# Patient Record
Sex: FEMALE | Race: BLACK OR AFRICAN AMERICAN | NOT HISPANIC OR LATINO | URBAN - METROPOLITAN AREA
[De-identification: names, ages, dates, MRNs, and addresses within clinical notes are randomized per-mention and may not be internally consistent; named-entity substitution may affect disease eponyms.]

---

## 2017-11-23 ENCOUNTER — HOSPITAL ENCOUNTER (INPATIENT)
Facility: HOSPITAL | Age: 62
LOS: 6 days | End: 2017-11-29
Attending: EMERGENCY MEDICINE | Admitting: INTERNAL MEDICINE

## 2017-11-23 ENCOUNTER — APPOINTMENT (OUTPATIENT)
Dept: GENERAL RADIOLOGY | Facility: HOSPITAL | Age: 62
End: 2017-11-23

## 2017-11-23 ENCOUNTER — APPOINTMENT (OUTPATIENT)
Dept: CT IMAGING | Facility: HOSPITAL | Age: 62
End: 2017-11-23

## 2017-11-23 DIAGNOSIS — R41.82 ALTERED MENTAL STATUS, UNSPECIFIED ALTERED MENTAL STATUS TYPE: ICD-10-CM

## 2017-11-23 DIAGNOSIS — T68.XXXA HYPOTHERMIA, INITIAL ENCOUNTER: Primary | ICD-10-CM

## 2017-11-23 DIAGNOSIS — R00.1 SINUS BRADYCARDIA: ICD-10-CM

## 2017-11-23 LAB
ALBUMIN SERPL-MCNC: 3.7 G/DL (ref 3.5–5.2)
ALBUMIN/GLOB SERPL: 1 G/DL
ALP SERPL-CCNC: 108 U/L (ref 39–117)
ALT SERPL W P-5'-P-CCNC: 78 U/L (ref 1–33)
AMPHET+METHAMPHET UR QL: NEGATIVE
ANION GAP SERPL CALCULATED.3IONS-SCNC: 13.2 MMOL/L
ANION GAP SERPL CALCULATED.3IONS-SCNC: 13.6 MMOL/L
AST SERPL-CCNC: 153 U/L (ref 1–32)
BACTERIA UR QL AUTO: ABNORMAL /HPF
BARBITURATES UR QL SCN: NEGATIVE
BASOPHILS # BLD AUTO: 0.01 10*3/MM3 (ref 0–0.2)
BASOPHILS NFR BLD AUTO: 0.1 % (ref 0–1.5)
BENZODIAZ UR QL SCN: NEGATIVE
BILIRUB SERPL-MCNC: 0.2 MG/DL (ref 0.1–1.2)
BILIRUB UR QL STRIP: NEGATIVE
BUN BLD-MCNC: 16 MG/DL (ref 8–23)
BUN BLD-MCNC: 18 MG/DL (ref 8–23)
BUN/CREAT SERPL: 26.9 (ref 7–25)
BUN/CREAT SERPL: 31.4 (ref 7–25)
CALCIUM SPEC-SCNC: 9.4 MG/DL (ref 8.6–10.5)
CALCIUM SPEC-SCNC: 9.5 MG/DL (ref 8.6–10.5)
CANNABINOIDS SERPL QL: NEGATIVE
CHLORIDE SERPL-SCNC: 105 MMOL/L (ref 98–107)
CHLORIDE SERPL-SCNC: 107 MMOL/L (ref 98–107)
CK SERPL-CCNC: 1817 U/L (ref 20–180)
CLARITY UR: CLEAR
CO2 SERPL-SCNC: 25.8 MMOL/L (ref 22–29)
CO2 SERPL-SCNC: 26.4 MMOL/L (ref 22–29)
COCAINE UR QL: NEGATIVE
COLOR UR: YELLOW
CREAT BLD-MCNC: 0.51 MG/DL (ref 0.57–1)
CREAT BLD-MCNC: 0.67 MG/DL (ref 0.57–1)
D-LACTATE SERPL-SCNC: 3.2 MMOL/L (ref 0.5–2)
DEPRECATED RDW RBC AUTO: 47.5 FL (ref 37–54)
EOSINOPHIL # BLD AUTO: 0 10*3/MM3 (ref 0–0.7)
EOSINOPHIL NFR BLD AUTO: 0 % (ref 0.3–6.2)
ERYTHROCYTE [DISTWIDTH] IN BLOOD BY AUTOMATED COUNT: 14.5 % (ref 11.7–13)
ETHANOL BLD-MCNC: <10 MG/DL (ref 0–10)
ETHANOL UR QL: <0.01 %
GFR SERPL CREATININE-BSD FRML MDRD: 108 ML/MIN/1.73
GFR SERPL CREATININE-BSD FRML MDRD: 148 ML/MIN/1.73
GLOBULIN UR ELPH-MCNC: 3.6 GM/DL
GLUCOSE BLD-MCNC: 101 MG/DL (ref 65–99)
GLUCOSE BLD-MCNC: 48 MG/DL (ref 65–99)
GLUCOSE BLDC GLUCOMTR-MCNC: 101 MG/DL (ref 70–130)
GLUCOSE BLDC GLUCOMTR-MCNC: 81 MG/DL (ref 70–130)
GLUCOSE BLDC GLUCOMTR-MCNC: 85 MG/DL (ref 70–130)
GLUCOSE UR STRIP-MCNC: NEGATIVE MG/DL
HCT VFR BLD AUTO: 35.7 % (ref 35.6–45.5)
HGB BLD-MCNC: 12.4 G/DL (ref 11.9–15.5)
HGB UR QL STRIP.AUTO: NEGATIVE
HOLD SPECIMEN: NORMAL
HYALINE CASTS UR QL AUTO: ABNORMAL /LPF
IMM GRANULOCYTES # BLD: 0.03 10*3/MM3 (ref 0–0.03)
IMM GRANULOCYTES NFR BLD: 0.3 % (ref 0–0.5)
KETONES UR QL STRIP: NEGATIVE
LEUKOCYTE ESTERASE UR QL STRIP.AUTO: ABNORMAL
LYMPHOCYTES # BLD AUTO: 0.77 10*3/MM3 (ref 0.9–4.8)
LYMPHOCYTES NFR BLD AUTO: 8.2 % (ref 19.6–45.3)
MCH RBC QN AUTO: 31 PG (ref 26.9–32)
MCHC RBC AUTO-ENTMCNC: 34.7 G/DL (ref 32.4–36.3)
MCV RBC AUTO: 89.3 FL (ref 80.5–98.2)
METHADONE UR QL SCN: NEGATIVE
MONOCYTES # BLD AUTO: 0.64 10*3/MM3 (ref 0.2–1.2)
MONOCYTES NFR BLD AUTO: 6.8 % (ref 5–12)
NEUTROPHILS # BLD AUTO: 7.93 10*3/MM3 (ref 1.9–8.1)
NEUTROPHILS NFR BLD AUTO: 84.6 % (ref 42.7–76)
NITRITE UR QL STRIP: NEGATIVE
OPIATES UR QL: NEGATIVE
OXYCODONE UR QL SCN: NEGATIVE
PH UR STRIP.AUTO: 6 [PH] (ref 5–8)
PLATELET # BLD AUTO: 379 10*3/MM3 (ref 140–500)
PMV BLD AUTO: 9.7 FL (ref 6–12)
POTASSIUM BLD-SCNC: 3.8 MMOL/L (ref 3.5–5.2)
POTASSIUM BLD-SCNC: 4.6 MMOL/L (ref 3.5–5.2)
PROCALCITONIN SERPL-MCNC: 0.05 NG/ML (ref 0.1–0.25)
PROT SERPL-MCNC: 7.3 G/DL (ref 6–8.5)
PROT UR QL STRIP: NEGATIVE
RBC # BLD AUTO: 4 10*6/MM3 (ref 3.9–5.2)
RBC # UR: ABNORMAL /HPF
REF LAB TEST METHOD: ABNORMAL
SODIUM BLD-SCNC: 144 MMOL/L (ref 136–145)
SODIUM BLD-SCNC: 147 MMOL/L (ref 136–145)
SP GR UR STRIP: 1.01 (ref 1–1.03)
SQUAMOUS #/AREA URNS HPF: ABNORMAL /HPF
TSH SERPL DL<=0.05 MIU/L-ACNC: 0.88 MIU/ML (ref 0.27–4.2)
UROBILINOGEN UR QL STRIP: ABNORMAL
WBC NRBC COR # BLD: 9.38 10*3/MM3 (ref 4.5–10.7)
WBC UR QL AUTO: ABNORMAL /HPF

## 2017-11-23 PROCEDURE — 80307 DRUG TEST PRSMV CHEM ANLYZR: CPT | Performed by: EMERGENCY MEDICINE

## 2017-11-23 PROCEDURE — 80048 BASIC METABOLIC PNL TOTAL CA: CPT | Performed by: INTERNAL MEDICINE

## 2017-11-23 PROCEDURE — 84145 PROCALCITONIN (PCT): CPT | Performed by: INTERNAL MEDICINE

## 2017-11-23 PROCEDURE — 70450 CT HEAD/BRAIN W/O DYE: CPT

## 2017-11-23 PROCEDURE — 93005 ELECTROCARDIOGRAM TRACING: CPT | Performed by: EMERGENCY MEDICINE

## 2017-11-23 PROCEDURE — 99291 CRITICAL CARE FIRST HOUR: CPT

## 2017-11-23 PROCEDURE — 25010000002 ENOXAPARIN PER 10 MG: Performed by: INTERNAL MEDICINE

## 2017-11-23 PROCEDURE — 94799 UNLISTED PULMONARY SVC/PX: CPT

## 2017-11-23 PROCEDURE — 83605 ASSAY OF LACTIC ACID: CPT | Performed by: INTERNAL MEDICINE

## 2017-11-23 PROCEDURE — 87086 URINE CULTURE/COLONY COUNT: CPT | Performed by: EMERGENCY MEDICINE

## 2017-11-23 PROCEDURE — 82550 ASSAY OF CK (CPK): CPT | Performed by: INTERNAL MEDICINE

## 2017-11-23 PROCEDURE — 84443 ASSAY THYROID STIM HORMONE: CPT | Performed by: EMERGENCY MEDICINE

## 2017-11-23 PROCEDURE — 80053 COMPREHEN METABOLIC PANEL: CPT | Performed by: EMERGENCY MEDICINE

## 2017-11-23 PROCEDURE — 93010 ELECTROCARDIOGRAM REPORT: CPT | Performed by: INTERNAL MEDICINE

## 2017-11-23 PROCEDURE — 81001 URINALYSIS AUTO W/SCOPE: CPT | Performed by: EMERGENCY MEDICINE

## 2017-11-23 PROCEDURE — 25010000002 LORAZEPAM PER 2 MG: Performed by: PSYCHIATRY & NEUROLOGY

## 2017-11-23 PROCEDURE — 99223 1ST HOSP IP/OBS HIGH 75: CPT | Performed by: PSYCHIATRY & NEUROLOGY

## 2017-11-23 PROCEDURE — 93005 ELECTROCARDIOGRAM TRACING: CPT | Performed by: PSYCHIATRY & NEUROLOGY

## 2017-11-23 PROCEDURE — 82962 GLUCOSE BLOOD TEST: CPT

## 2017-11-23 PROCEDURE — 93005 ELECTROCARDIOGRAM TRACING: CPT | Performed by: INTERNAL MEDICINE

## 2017-11-23 PROCEDURE — 85025 COMPLETE CBC W/AUTO DIFF WBC: CPT | Performed by: EMERGENCY MEDICINE

## 2017-11-23 PROCEDURE — 71010 HC CHEST PA OR AP: CPT

## 2017-11-23 PROCEDURE — 25010000002 LEVETIRACETAM IN NACL 0.82% 500 MG/100ML SOLUTION: Performed by: INTERNAL MEDICINE

## 2017-11-23 PROCEDURE — 25010000002 LEVOFLOXACIN PER 250 MG: Performed by: INTERNAL MEDICINE

## 2017-11-23 PROCEDURE — 92610 EVALUATE SWALLOWING FUNCTION: CPT

## 2017-11-23 RX ORDER — LEVETIRACETAM 5 MG/ML
500 INJECTION INTRAVASCULAR EVERY 12 HOURS SCHEDULED
Status: DISCONTINUED | OUTPATIENT
Start: 2017-11-23 | End: 2017-11-23

## 2017-11-23 RX ORDER — ONDANSETRON 2 MG/ML
4 INJECTION INTRAMUSCULAR; INTRAVENOUS EVERY 6 HOURS PRN
Status: DISCONTINUED | OUTPATIENT
Start: 2017-11-23 | End: 2017-11-29 | Stop reason: HOSPADM

## 2017-11-23 RX ORDER — LORAZEPAM 2 MG/ML
1 INJECTION INTRAMUSCULAR EVERY 4 HOURS PRN
Status: DISCONTINUED | OUTPATIENT
Start: 2017-11-23 | End: 2017-11-29 | Stop reason: HOSPADM

## 2017-11-23 RX ORDER — ACETAMINOPHEN 325 MG/1
650 TABLET ORAL EVERY 4 HOURS PRN
Status: DISCONTINUED | OUTPATIENT
Start: 2017-11-23 | End: 2017-11-29 | Stop reason: HOSPADM

## 2017-11-23 RX ORDER — LEVOFLOXACIN 5 MG/ML
500 INJECTION, SOLUTION INTRAVENOUS EVERY 24 HOURS
Status: DISCONTINUED | OUTPATIENT
Start: 2017-11-23 | End: 2017-11-23

## 2017-11-23 RX ORDER — ACETAMINOPHEN 650 MG/1
650 SUPPOSITORY RECTAL EVERY 4 HOURS PRN
Status: DISCONTINUED | OUTPATIENT
Start: 2017-11-23 | End: 2017-11-29 | Stop reason: HOSPADM

## 2017-11-23 RX ORDER — DEXTROSE MONOHYDRATE 25 G/50ML
INJECTION, SOLUTION INTRAVENOUS
Status: DISPENSED
Start: 2017-11-23 | End: 2017-11-24

## 2017-11-23 RX ORDER — SODIUM CHLORIDE 9 MG/ML
125 INJECTION, SOLUTION INTRAVENOUS CONTINUOUS
Status: DISCONTINUED | OUTPATIENT
Start: 2017-11-23 | End: 2017-11-24

## 2017-11-23 RX ORDER — LEVETIRACETAM 5 MG/ML
500 INJECTION INTRAVASCULAR EVERY 6 HOURS
Status: DISCONTINUED | OUTPATIENT
Start: 2017-11-24 | End: 2017-11-24

## 2017-11-23 RX ORDER — DEXTROSE MONOHYDRATE, SODIUM CHLORIDE, SODIUM LACTATE, POTASSIUM CHLORIDE, CALCIUM CHLORIDE 5; 600; 310; 179; 20 G/100ML; MG/100ML; MG/100ML; MG/100ML; MG/100ML
200 INJECTION, SOLUTION INTRAVENOUS CONTINUOUS
Status: DISCONTINUED | OUTPATIENT
Start: 2017-11-23 | End: 2017-11-24

## 2017-11-23 RX ORDER — ZIPRASIDONE MESYLATE 20 MG/ML
20 INJECTION, POWDER, LYOPHILIZED, FOR SOLUTION INTRAMUSCULAR EVERY 4 HOURS PRN
Status: DISCONTINUED | OUTPATIENT
Start: 2017-11-23 | End: 2017-11-29 | Stop reason: HOSPADM

## 2017-11-23 RX ORDER — SODIUM CHLORIDE 0.9 % (FLUSH) 0.9 %
1-10 SYRINGE (ML) INJECTION AS NEEDED
Status: DISCONTINUED | OUTPATIENT
Start: 2017-11-23 | End: 2017-11-29 | Stop reason: HOSPADM

## 2017-11-23 RX ADMIN — DEXTROSE MONOHYDRATE, SODIUM CHLORIDE, SODIUM LACTATE, POTASSIUM CHLORIDE, CALCIUM CHLORIDE 200 ML/HR: 5; 600; 310; 179; 20 INJECTION, SOLUTION INTRAVENOUS at 16:06

## 2017-11-23 RX ADMIN — SODIUM CHLORIDE 125 ML/HR: 9 INJECTION, SOLUTION INTRAVENOUS at 12:37

## 2017-11-23 RX ADMIN — ENOXAPARIN SODIUM 40 MG: 40 INJECTION SUBCUTANEOUS at 18:13

## 2017-11-23 RX ADMIN — DEXTROSE MONOHYDRATE, SODIUM CHLORIDE, SODIUM LACTATE, POTASSIUM CHLORIDE, CALCIUM CHLORIDE 200 ML/HR: 5; 600; 310; 179; 20 INJECTION, SOLUTION INTRAVENOUS at 21:58

## 2017-11-23 RX ADMIN — LEVETIRACETAM 500 MG: 500 INJECTION, SOLUTION INTRAVENOUS at 20:00

## 2017-11-23 RX ADMIN — AZTREONAM 1 G: 1 INJECTION, POWDER, LYOPHILIZED, FOR SOLUTION INTRAMUSCULAR; INTRAVENOUS at 18:13

## 2017-11-23 RX ADMIN — LORAZEPAM 1 MG: 2 INJECTION INTRAMUSCULAR; INTRAVENOUS at 21:58

## 2017-11-24 ENCOUNTER — APPOINTMENT (OUTPATIENT)
Dept: NEUROLOGY | Facility: HOSPITAL | Age: 62
End: 2017-11-24
Attending: PSYCHIATRY & NEUROLOGY

## 2017-11-24 ENCOUNTER — APPOINTMENT (OUTPATIENT)
Dept: GENERAL RADIOLOGY | Facility: HOSPITAL | Age: 62
End: 2017-11-24
Attending: INTERNAL MEDICINE

## 2017-11-24 LAB
ANION GAP SERPL CALCULATED.3IONS-SCNC: 7.8 MMOL/L
ANION GAP SERPL CALCULATED.3IONS-SCNC: 8.5 MMOL/L
ARTERIAL PATENCY WRIST A: ABNORMAL
ATMOSPHERIC PRESS: 744.8 MMHG
BASE EXCESS BLDA CALC-SCNC: 2.9 MMOL/L (ref 0–2)
BDY SITE: ABNORMAL
BUN BLD-MCNC: 11 MG/DL (ref 8–23)
BUN BLD-MCNC: 13 MG/DL (ref 8–23)
BUN/CREAT SERPL: 15.9 (ref 7–25)
BUN/CREAT SERPL: 17.8 (ref 7–25)
CALCIUM SPEC-SCNC: 8.7 MG/DL (ref 8.6–10.5)
CALCIUM SPEC-SCNC: 8.7 MG/DL (ref 8.6–10.5)
CHLORIDE SERPL-SCNC: 110 MMOL/L (ref 98–107)
CHLORIDE SERPL-SCNC: 113 MMOL/L (ref 98–107)
CO2 SERPL-SCNC: 27.2 MMOL/L (ref 22–29)
CO2 SERPL-SCNC: 27.5 MMOL/L (ref 22–29)
CREAT BLD-MCNC: 0.69 MG/DL (ref 0.57–1)
CREAT BLD-MCNC: 0.73 MG/DL (ref 0.57–1)
D-LACTATE SERPL-SCNC: 1.3 MMOL/L (ref 0.5–2)
DEPRECATED RDW RBC AUTO: 46.8 FL (ref 37–54)
ERYTHROCYTE [DISTWIDTH] IN BLOOD BY AUTOMATED COUNT: 14.1 % (ref 11.7–13)
GAS FLOW AIRWAY: 8 LPM
GFR SERPL CREATININE-BSD FRML MDRD: 104 ML/MIN/1.73
GFR SERPL CREATININE-BSD FRML MDRD: 98 ML/MIN/1.73
GLUCOSE BLD-MCNC: 64 MG/DL (ref 65–99)
GLUCOSE BLD-MCNC: 78 MG/DL (ref 65–99)
GLUCOSE BLDC GLUCOMTR-MCNC: 125 MG/DL (ref 70–130)
GLUCOSE BLDC GLUCOMTR-MCNC: 59 MG/DL (ref 70–130)
GLUCOSE BLDC GLUCOMTR-MCNC: 76 MG/DL (ref 70–130)
GLUCOSE BLDC GLUCOMTR-MCNC: 83 MG/DL (ref 70–130)
GLUCOSE BLDC GLUCOMTR-MCNC: 83 MG/DL (ref 70–130)
GLUCOSE BLDC GLUCOMTR-MCNC: 86 MG/DL (ref 70–130)
GLUCOSE BLDC GLUCOMTR-MCNC: 89 MG/DL (ref 70–130)
HCO3 BLDA-SCNC: 26.8 MMOL/L (ref 22–28)
HCT VFR BLD AUTO: 31.1 % (ref 35.6–45.5)
HGB BLD-MCNC: 10.3 G/DL (ref 11.9–15.5)
MCH RBC QN AUTO: 30.2 PG (ref 26.9–32)
MCHC RBC AUTO-ENTMCNC: 33.1 G/DL (ref 32.4–36.3)
MCV RBC AUTO: 91.2 FL (ref 80.5–98.2)
MODALITY: ABNORMAL
PCO2 BLDA: 37.4 MM HG (ref 35–45)
PH BLDA: 7.46 PH UNITS (ref 7.35–7.45)
PLATELET # BLD AUTO: 315 10*3/MM3 (ref 140–500)
PMV BLD AUTO: 9.8 FL (ref 6–12)
PO2 BLDA: 68.7 MM HG (ref 80–100)
POTASSIUM BLD-SCNC: 3.9 MMOL/L (ref 3.5–5.2)
POTASSIUM BLD-SCNC: 4.2 MMOL/L (ref 3.5–5.2)
RBC # BLD AUTO: 3.41 10*6/MM3 (ref 3.9–5.2)
SAO2 % BLDCOA: 94.5 % (ref 92–99)
SODIUM BLD-SCNC: 146 MMOL/L (ref 136–145)
SODIUM BLD-SCNC: 148 MMOL/L (ref 136–145)
TOTAL RATE: 14 BREATHS/MINUTE
WBC NRBC COR # BLD: 5.91 10*3/MM3 (ref 4.5–10.7)

## 2017-11-24 PROCEDURE — 94799 UNLISTED PULMONARY SVC/PX: CPT

## 2017-11-24 PROCEDURE — 85027 COMPLETE CBC AUTOMATED: CPT | Performed by: INTERNAL MEDICINE

## 2017-11-24 PROCEDURE — 25010000002 LEVETIRACETAM IN NACL 0.82% 500 MG/100ML SOLUTION: Performed by: PSYCHIATRY & NEUROLOGY

## 2017-11-24 PROCEDURE — 25010000002 MEROPENEM PER 100 MG: Performed by: INTERNAL MEDICINE

## 2017-11-24 PROCEDURE — 82803 BLOOD GASES ANY COMBINATION: CPT

## 2017-11-24 PROCEDURE — 25010000003 LEVETIRACETAM IN NACL 0.75% 1000 MG/100ML SOLUTION: Performed by: PSYCHIATRY & NEUROLOGY

## 2017-11-24 PROCEDURE — 82962 GLUCOSE BLOOD TEST: CPT

## 2017-11-24 PROCEDURE — 80048 BASIC METABOLIC PNL TOTAL CA: CPT | Performed by: INTERNAL MEDICINE

## 2017-11-24 PROCEDURE — 83605 ASSAY OF LACTIC ACID: CPT | Performed by: INTERNAL MEDICINE

## 2017-11-24 PROCEDURE — 95819 EEG AWAKE AND ASLEEP: CPT | Performed by: PSYCHIATRY & NEUROLOGY

## 2017-11-24 PROCEDURE — 95816 EEG AWAKE AND DROWSY: CPT

## 2017-11-24 PROCEDURE — 87040 BLOOD CULTURE FOR BACTERIA: CPT | Performed by: INTERNAL MEDICINE

## 2017-11-24 PROCEDURE — 99231 SBSQ HOSP IP/OBS SF/LOW 25: CPT | Performed by: PSYCHIATRY & NEUROLOGY

## 2017-11-24 PROCEDURE — 25010000002 LORAZEPAM PER 2 MG: Performed by: PSYCHIATRY & NEUROLOGY

## 2017-11-24 PROCEDURE — 25010000002 ENOXAPARIN PER 10 MG: Performed by: INTERNAL MEDICINE

## 2017-11-24 PROCEDURE — 36600 WITHDRAWAL OF ARTERIAL BLOOD: CPT

## 2017-11-24 PROCEDURE — 71010 HC CHEST PA OR AP: CPT

## 2017-11-24 RX ORDER — LORAZEPAM 2 MG/ML
1 INJECTION INTRAMUSCULAR ONCE
Status: DISCONTINUED | OUTPATIENT
Start: 2017-11-24 | End: 2017-11-29 | Stop reason: HOSPADM

## 2017-11-24 RX ORDER — DEXTROSE MONOHYDRATE 50 MG/ML
100 INJECTION, SOLUTION INTRAVENOUS CONTINUOUS
Status: ACTIVE | OUTPATIENT
Start: 2017-11-24 | End: 2017-11-24

## 2017-11-24 RX ORDER — DEXTROSE MONOHYDRATE 25 G/50ML
50 INJECTION, SOLUTION INTRAVENOUS
Status: DISCONTINUED | OUTPATIENT
Start: 2017-11-24 | End: 2017-11-29 | Stop reason: HOSPADM

## 2017-11-24 RX ORDER — LEVETIRACETAM 10 MG/ML
1000 INJECTION INTRAVASCULAR EVERY 6 HOURS
Status: DISCONTINUED | OUTPATIENT
Start: 2017-11-24 | End: 2017-11-27

## 2017-11-24 RX ORDER — SODIUM CHLORIDE, SODIUM LACTATE, POTASSIUM CHLORIDE, CALCIUM CHLORIDE 600; 310; 30; 20 MG/100ML; MG/100ML; MG/100ML; MG/100ML
150 INJECTION, SOLUTION INTRAVENOUS CONTINUOUS
Status: DISCONTINUED | OUTPATIENT
Start: 2017-11-24 | End: 2017-11-25

## 2017-11-24 RX ADMIN — SODIUM CHLORIDE 200 MG: 9 INJECTION, SOLUTION INTRAVENOUS at 17:35

## 2017-11-24 RX ADMIN — LEVETIRACETAM 500 MG: 500 INJECTION, SOLUTION INTRAVENOUS at 09:34

## 2017-11-24 RX ADMIN — LEVETIRACETAM 1000 MG: 10 INJECTION INTRAVENOUS at 13:58

## 2017-11-24 RX ADMIN — LEVETIRACETAM 500 MG: 500 INJECTION, SOLUTION INTRAVENOUS at 01:19

## 2017-11-24 RX ADMIN — LEVETIRACETAM 1000 MG: 10 INJECTION INTRAVENOUS at 18:35

## 2017-11-24 RX ADMIN — ENOXAPARIN SODIUM 40 MG: 40 INJECTION SUBCUTANEOUS at 17:39

## 2017-11-24 RX ADMIN — AZTREONAM 1 G: 1 INJECTION, POWDER, LYOPHILIZED, FOR SOLUTION INTRAMUSCULAR; INTRAVENOUS at 01:10

## 2017-11-24 RX ADMIN — AZTREONAM 1 G: 1 INJECTION, POWDER, LYOPHILIZED, FOR SOLUTION INTRAMUSCULAR; INTRAVENOUS at 17:38

## 2017-11-24 RX ADMIN — LORAZEPAM 1 MG: 2 INJECTION INTRAMUSCULAR; INTRAVENOUS at 03:18

## 2017-11-24 RX ADMIN — DEXTROSE MONOHYDRATE, SODIUM CHLORIDE, SODIUM LACTATE, POTASSIUM CHLORIDE, CALCIUM CHLORIDE 200 ML/HR: 5; 600; 310; 179; 20 INJECTION, SOLUTION INTRAVENOUS at 01:18

## 2017-11-24 RX ADMIN — AZTREONAM 1 G: 1 INJECTION, POWDER, LYOPHILIZED, FOR SOLUTION INTRAMUSCULAR; INTRAVENOUS at 10:06

## 2017-11-24 RX ADMIN — DEXTROSE MONOHYDRATE 100 ML/HR: 5 INJECTION, SOLUTION INTRAVENOUS at 13:21

## 2017-11-24 RX ADMIN — SODIUM CHLORIDE, POTASSIUM CHLORIDE, SODIUM LACTATE AND CALCIUM CHLORIDE 150 ML/HR: 600; 310; 30; 20 INJECTION, SOLUTION INTRAVENOUS at 11:27

## 2017-11-24 RX ADMIN — MEROPENEM 1 G: 1 INJECTION, POWDER, FOR SOLUTION INTRAVENOUS at 21:03

## 2017-11-24 RX ADMIN — DEXTROSE MONOHYDRATE 50 ML: 25 INJECTION, SOLUTION INTRAVENOUS at 00:07

## 2017-11-25 LAB
ANION GAP SERPL CALCULATED.3IONS-SCNC: 9.8 MMOL/L
BACTERIA SPEC AEROBE CULT: NO GROWTH
BUN BLD-MCNC: 7 MG/DL (ref 8–23)
BUN/CREAT SERPL: 11.1 (ref 7–25)
CALCIUM SPEC-SCNC: 8.5 MG/DL (ref 8.6–10.5)
CHLORIDE SERPL-SCNC: 108 MMOL/L (ref 98–107)
CO2 SERPL-SCNC: 26.2 MMOL/L (ref 22–29)
CREAT BLD-MCNC: 0.63 MG/DL (ref 0.57–1)
GFR SERPL CREATININE-BSD FRML MDRD: 116 ML/MIN/1.73
GLUCOSE BLD-MCNC: 93 MG/DL (ref 65–99)
GLUCOSE BLDC GLUCOMTR-MCNC: 75 MG/DL (ref 70–130)
GLUCOSE BLDC GLUCOMTR-MCNC: 81 MG/DL (ref 70–130)
GLUCOSE BLDC GLUCOMTR-MCNC: 83 MG/DL (ref 70–130)
GLUCOSE BLDC GLUCOMTR-MCNC: 87 MG/DL (ref 70–130)
POTASSIUM BLD-SCNC: 4 MMOL/L (ref 3.5–5.2)
SODIUM BLD-SCNC: 144 MMOL/L (ref 136–145)

## 2017-11-25 PROCEDURE — 25010000002 MEROPENEM PER 100 MG: Performed by: INTERNAL MEDICINE

## 2017-11-25 PROCEDURE — 82962 GLUCOSE BLOOD TEST: CPT

## 2017-11-25 PROCEDURE — 99233 SBSQ HOSP IP/OBS HIGH 50: CPT | Performed by: PSYCHIATRY & NEUROLOGY

## 2017-11-25 PROCEDURE — 25010000002 ENOXAPARIN PER 10 MG: Performed by: INTERNAL MEDICINE

## 2017-11-25 PROCEDURE — 25010000003 LEVETIRACETAM IN NACL 0.75% 1000 MG/100ML SOLUTION: Performed by: PSYCHIATRY & NEUROLOGY

## 2017-11-25 PROCEDURE — 94799 UNLISTED PULMONARY SVC/PX: CPT

## 2017-11-25 PROCEDURE — 80048 BASIC METABOLIC PNL TOTAL CA: CPT | Performed by: INTERNAL MEDICINE

## 2017-11-25 RX ORDER — DEXTROSE MONOHYDRATE 50 MG/ML
100 INJECTION, SOLUTION INTRAVENOUS CONTINUOUS
Status: DISCONTINUED | OUTPATIENT
Start: 2017-11-25 | End: 2017-11-25

## 2017-11-25 RX ORDER — DEXTROSE AND SODIUM CHLORIDE 5; .45 G/100ML; G/100ML
75 INJECTION, SOLUTION INTRAVENOUS CONTINUOUS
Status: DISCONTINUED | OUTPATIENT
Start: 2017-11-25 | End: 2017-11-28 | Stop reason: CLARIF

## 2017-11-25 RX ADMIN — SODIUM CHLORIDE, POTASSIUM CHLORIDE, SODIUM LACTATE AND CALCIUM CHLORIDE 150 ML/HR: 600; 310; 30; 20 INJECTION, SOLUTION INTRAVENOUS at 00:13

## 2017-11-25 RX ADMIN — LEVETIRACETAM 1000 MG: 10 INJECTION INTRAVENOUS at 06:19

## 2017-11-25 RX ADMIN — MEROPENEM 1 G: 1 INJECTION, POWDER, FOR SOLUTION INTRAVENOUS at 14:30

## 2017-11-25 RX ADMIN — ENOXAPARIN SODIUM 40 MG: 40 INJECTION SUBCUTANEOUS at 14:32

## 2017-11-25 RX ADMIN — LEVETIRACETAM 1000 MG: 10 INJECTION INTRAVENOUS at 17:23

## 2017-11-25 RX ADMIN — SODIUM CHLORIDE 200 MG: 9 INJECTION, SOLUTION INTRAVENOUS at 11:15

## 2017-11-25 RX ADMIN — LEVETIRACETAM 1000 MG: 10 INJECTION INTRAVENOUS at 11:52

## 2017-11-25 RX ADMIN — SODIUM CHLORIDE 200 MG: 9 INJECTION, SOLUTION INTRAVENOUS at 00:35

## 2017-11-25 RX ADMIN — LEVETIRACETAM 1000 MG: 10 INJECTION INTRAVENOUS at 00:13

## 2017-11-25 RX ADMIN — MEROPENEM 1 G: 1 INJECTION, POWDER, FOR SOLUTION INTRAVENOUS at 22:02

## 2017-11-25 RX ADMIN — DEXTROSE AND SODIUM CHLORIDE 75 ML/HR: 5; 450 INJECTION, SOLUTION INTRAVENOUS at 11:11

## 2017-11-25 RX ADMIN — MEROPENEM 1 G: 1 INJECTION, POWDER, FOR SOLUTION INTRAVENOUS at 04:52

## 2017-11-25 RX ADMIN — SODIUM CHLORIDE 200 MG: 9 INJECTION, SOLUTION INTRAVENOUS at 23:42

## 2017-11-26 LAB
ANION GAP SERPL CALCULATED.3IONS-SCNC: 10.2 MMOL/L
BUN BLD-MCNC: 7 MG/DL (ref 8–23)
BUN/CREAT SERPL: 11.7 (ref 7–25)
CALCIUM SPEC-SCNC: 8.7 MG/DL (ref 8.6–10.5)
CHLORIDE SERPL-SCNC: 107 MMOL/L (ref 98–107)
CO2 SERPL-SCNC: 24.8 MMOL/L (ref 22–29)
CREAT BLD-MCNC: 0.6 MG/DL (ref 0.57–1)
GFR SERPL CREATININE-BSD FRML MDRD: 123 ML/MIN/1.73
GLUCOSE BLD-MCNC: 82 MG/DL (ref 65–99)
GLUCOSE BLDC GLUCOMTR-MCNC: 112 MG/DL (ref 70–130)
GLUCOSE BLDC GLUCOMTR-MCNC: 71 MG/DL (ref 70–130)
GLUCOSE BLDC GLUCOMTR-MCNC: 82 MG/DL (ref 70–130)
GLUCOSE BLDC GLUCOMTR-MCNC: 83 MG/DL (ref 70–130)
GLUCOSE BLDC GLUCOMTR-MCNC: 84 MG/DL (ref 70–130)
GLUCOSE BLDC GLUCOMTR-MCNC: 84 MG/DL (ref 70–130)
GLUCOSE BLDC GLUCOMTR-MCNC: 90 MG/DL (ref 70–130)
GLUCOSE BLDC GLUCOMTR-MCNC: 91 MG/DL (ref 70–130)
POTASSIUM BLD-SCNC: 3.4 MMOL/L (ref 3.5–5.2)
SODIUM BLD-SCNC: 142 MMOL/L (ref 136–145)

## 2017-11-26 PROCEDURE — 80048 BASIC METABOLIC PNL TOTAL CA: CPT | Performed by: INTERNAL MEDICINE

## 2017-11-26 PROCEDURE — 25010000003 LEVETIRACETAM IN NACL 0.75% 1000 MG/100ML SOLUTION: Performed by: PSYCHIATRY & NEUROLOGY

## 2017-11-26 PROCEDURE — 94799 UNLISTED PULMONARY SVC/PX: CPT

## 2017-11-26 PROCEDURE — 25010000002 MEROPENEM PER 100 MG: Performed by: INTERNAL MEDICINE

## 2017-11-26 PROCEDURE — 82962 GLUCOSE BLOOD TEST: CPT

## 2017-11-26 PROCEDURE — 25010000003 POTASSIUM CHLORIDE 10 MEQ/100ML SOLUTION: Performed by: INTERNAL MEDICINE

## 2017-11-26 PROCEDURE — 25010000002 ENOXAPARIN PER 10 MG: Performed by: INTERNAL MEDICINE

## 2017-11-26 PROCEDURE — 99232 SBSQ HOSP IP/OBS MODERATE 35: CPT | Performed by: PSYCHIATRY & NEUROLOGY

## 2017-11-26 RX ORDER — POTASSIUM CHLORIDE 7.45 MG/ML
10 INJECTION INTRAVENOUS
Status: DISCONTINUED | OUTPATIENT
Start: 2017-11-26 | End: 2017-11-29 | Stop reason: HOSPADM

## 2017-11-26 RX ORDER — POTASSIUM CHLORIDE 1.5 G/1.77G
40 POWDER, FOR SOLUTION ORAL AS NEEDED
Status: DISCONTINUED | OUTPATIENT
Start: 2017-11-26 | End: 2017-11-29 | Stop reason: HOSPADM

## 2017-11-26 RX ORDER — POTASSIUM CHLORIDE 750 MG/1
40 CAPSULE, EXTENDED RELEASE ORAL AS NEEDED
Status: DISCONTINUED | OUTPATIENT
Start: 2017-11-26 | End: 2017-11-29 | Stop reason: HOSPADM

## 2017-11-26 RX ADMIN — MEROPENEM 1 G: 1 INJECTION, POWDER, FOR SOLUTION INTRAVENOUS at 12:32

## 2017-11-26 RX ADMIN — POTASSIUM CHLORIDE 10 MEQ: 7.46 INJECTION, SOLUTION INTRAVENOUS at 14:42

## 2017-11-26 RX ADMIN — LEVETIRACETAM 1000 MG: 10 INJECTION INTRAVENOUS at 05:54

## 2017-11-26 RX ADMIN — POTASSIUM CHLORIDE 10 MEQ: 7.46 INJECTION, SOLUTION INTRAVENOUS at 15:41

## 2017-11-26 RX ADMIN — LEVETIRACETAM 1000 MG: 10 INJECTION INTRAVENOUS at 02:27

## 2017-11-26 RX ADMIN — MEROPENEM 1 G: 1 INJECTION, POWDER, FOR SOLUTION INTRAVENOUS at 05:09

## 2017-11-26 RX ADMIN — POTASSIUM CHLORIDE 10 MEQ: 7.46 INJECTION, SOLUTION INTRAVENOUS at 18:38

## 2017-11-26 RX ADMIN — LEVETIRACETAM 1000 MG: 10 INJECTION INTRAVENOUS at 18:38

## 2017-11-26 RX ADMIN — MEROPENEM 1 G: 1 INJECTION, POWDER, FOR SOLUTION INTRAVENOUS at 21:57

## 2017-11-26 RX ADMIN — LEVETIRACETAM 1000 MG: 10 INJECTION INTRAVENOUS at 11:23

## 2017-11-26 RX ADMIN — LEVETIRACETAM 1000 MG: 10 INJECTION INTRAVENOUS at 23:49

## 2017-11-26 RX ADMIN — ENOXAPARIN SODIUM 40 MG: 40 INJECTION SUBCUTANEOUS at 14:44

## 2017-11-26 RX ADMIN — POTASSIUM CHLORIDE 10 MEQ: 7.46 INJECTION, SOLUTION INTRAVENOUS at 17:03

## 2017-11-26 RX ADMIN — SODIUM CHLORIDE 200 MG: 9 INJECTION, SOLUTION INTRAVENOUS at 11:23

## 2017-11-27 LAB
ANION GAP SERPL CALCULATED.3IONS-SCNC: 9.9 MMOL/L
BUN BLD-MCNC: 5 MG/DL (ref 8–23)
BUN/CREAT SERPL: 7.1 (ref 7–25)
CALCIUM SPEC-SCNC: 8.6 MG/DL (ref 8.6–10.5)
CHLORIDE SERPL-SCNC: 106 MMOL/L (ref 98–107)
CO2 SERPL-SCNC: 26.1 MMOL/L (ref 22–29)
CREAT BLD-MCNC: 0.7 MG/DL (ref 0.57–1)
GFR SERPL CREATININE-BSD FRML MDRD: 103 ML/MIN/1.73
GLUCOSE BLD-MCNC: 93 MG/DL (ref 65–99)
GLUCOSE BLDC GLUCOMTR-MCNC: 102 MG/DL (ref 70–130)
GLUCOSE BLDC GLUCOMTR-MCNC: 96 MG/DL (ref 70–130)
GLUCOSE BLDC GLUCOMTR-MCNC: 96 MG/DL (ref 70–130)
GLUCOSE BLDC GLUCOMTR-MCNC: 98 MG/DL (ref 70–130)
POTASSIUM BLD-SCNC: 3.8 MMOL/L (ref 3.5–5.2)
SODIUM BLD-SCNC: 142 MMOL/L (ref 136–145)

## 2017-11-27 PROCEDURE — 99231 SBSQ HOSP IP/OBS SF/LOW 25: CPT | Performed by: PSYCHIATRY & NEUROLOGY

## 2017-11-27 PROCEDURE — 25010000002 LORAZEPAM PER 2 MG: Performed by: PSYCHIATRY & NEUROLOGY

## 2017-11-27 PROCEDURE — 25010000002 ENOXAPARIN PER 10 MG: Performed by: INTERNAL MEDICINE

## 2017-11-27 PROCEDURE — 25010000002 MEROPENEM PER 100 MG: Performed by: INTERNAL MEDICINE

## 2017-11-27 PROCEDURE — 82962 GLUCOSE BLOOD TEST: CPT

## 2017-11-27 PROCEDURE — 80048 BASIC METABOLIC PNL TOTAL CA: CPT | Performed by: INTERNAL MEDICINE

## 2017-11-27 PROCEDURE — 94799 UNLISTED PULMONARY SVC/PX: CPT

## 2017-11-27 PROCEDURE — 25010000003 LEVETIRACETAM IN NACL 0.75% 1000 MG/100ML SOLUTION: Performed by: INTERNAL MEDICINE

## 2017-11-27 PROCEDURE — 25010000002 HALOPERIDOL LACTATE PER 5 MG: Performed by: INTERNAL MEDICINE

## 2017-11-27 PROCEDURE — 25010000003 LEVETIRACETAM IN NACL 0.75% 1000 MG/100ML SOLUTION: Performed by: PSYCHIATRY & NEUROLOGY

## 2017-11-27 RX ORDER — NAPROXEN 250 MG/1
250 TABLET ORAL 2 TIMES DAILY
COMMUNITY
End: 2017-11-29 | Stop reason: HOSPADM

## 2017-11-27 RX ORDER — HALOPERIDOL 5 MG/ML
5 INJECTION INTRAMUSCULAR ONCE
Status: COMPLETED | OUTPATIENT
Start: 2017-11-27 | End: 2017-11-27

## 2017-11-27 RX ORDER — LEVETIRACETAM 10 MG/ML
1000 INJECTION INTRAVASCULAR EVERY 12 HOURS SCHEDULED
Status: DISCONTINUED | OUTPATIENT
Start: 2017-11-27 | End: 2017-11-28 | Stop reason: CLARIF

## 2017-11-27 RX ORDER — LEVETIRACETAM 500 MG/1
500 TABLET ORAL 2 TIMES DAILY
COMMUNITY
End: 2017-11-29 | Stop reason: HOSPADM

## 2017-11-27 RX ORDER — LEVETIRACETAM 500 MG/1
1000 TABLET ORAL 2 TIMES DAILY
Status: DISCONTINUED | OUTPATIENT
Start: 2017-11-27 | End: 2017-11-27

## 2017-11-27 RX ORDER — HALOPERIDOL 5 MG/1
5 TABLET ORAL DAILY
COMMUNITY
End: 2017-12-08 | Stop reason: HOSPADM

## 2017-11-27 RX ADMIN — HALOPERIDOL LACTATE 5 MG: 5 INJECTION, SOLUTION INTRAMUSCULAR at 14:15

## 2017-11-27 RX ADMIN — MEROPENEM 1 G: 1 INJECTION, POWDER, FOR SOLUTION INTRAVENOUS at 14:43

## 2017-11-27 RX ADMIN — LORAZEPAM 1 MG: 2 INJECTION INTRAMUSCULAR; INTRAVENOUS at 11:30

## 2017-11-27 RX ADMIN — LEVETIRACETAM 1000 MG: 10 INJECTION INTRAVENOUS at 06:00

## 2017-11-27 RX ADMIN — LEVETIRACETAM 1000 MG: 1000 INJECTION, SOLUTION INTRAVENOUS at 18:55

## 2017-11-27 RX ADMIN — LEVETIRACETAM 1000 MG: 10 INJECTION INTRAVENOUS at 13:46

## 2017-11-27 RX ADMIN — MEROPENEM 1 G: 1 INJECTION, POWDER, FOR SOLUTION INTRAVENOUS at 21:51

## 2017-11-27 RX ADMIN — DEXTROSE AND SODIUM CHLORIDE 75 ML/HR: 5; 450 INJECTION, SOLUTION INTRAVENOUS at 06:20

## 2017-11-27 RX ADMIN — MEROPENEM 1 G: 1 INJECTION, POWDER, FOR SOLUTION INTRAVENOUS at 06:00

## 2017-11-27 RX ADMIN — ENOXAPARIN SODIUM 40 MG: 40 INJECTION SUBCUTANEOUS at 17:36

## 2017-11-28 LAB
DEPRECATED RDW RBC AUTO: 48.2 FL (ref 37–54)
ERYTHROCYTE [DISTWIDTH] IN BLOOD BY AUTOMATED COUNT: 14.3 % (ref 11.7–13)
GLUCOSE BLDC GLUCOMTR-MCNC: 103 MG/DL (ref 70–130)
GLUCOSE BLDC GLUCOMTR-MCNC: 130 MG/DL (ref 70–130)
GLUCOSE BLDC GLUCOMTR-MCNC: 131 MG/DL (ref 70–130)
GLUCOSE BLDC GLUCOMTR-MCNC: 154 MG/DL (ref 70–130)
GLUCOSE BLDC GLUCOMTR-MCNC: 87 MG/DL (ref 70–130)
GLUCOSE BLDC GLUCOMTR-MCNC: 99 MG/DL (ref 70–130)
HCT VFR BLD AUTO: 31.8 % (ref 35.6–45.5)
HGB BLD-MCNC: 10.5 G/DL (ref 11.9–15.5)
MCH RBC QN AUTO: 30.5 PG (ref 26.9–32)
MCHC RBC AUTO-ENTMCNC: 33 G/DL (ref 32.4–36.3)
MCV RBC AUTO: 92.4 FL (ref 80.5–98.2)
PLATELET # BLD AUTO: 240 10*3/MM3 (ref 140–500)
PLATELET # BLD AUTO: 257 10*3/MM3 (ref 140–500)
PMV BLD AUTO: 11.1 FL (ref 6–12)
RBC # BLD AUTO: 3.44 10*6/MM3 (ref 3.9–5.2)
WBC NRBC COR # BLD: 6.06 10*3/MM3 (ref 4.5–10.7)

## 2017-11-28 PROCEDURE — 25010000002 ENOXAPARIN PER 10 MG: Performed by: INTERNAL MEDICINE

## 2017-11-28 PROCEDURE — 82962 GLUCOSE BLOOD TEST: CPT

## 2017-11-28 PROCEDURE — 25010000003 LEVETIRACETAM IN NACL 0.75% 1000 MG/100ML SOLUTION: Performed by: INTERNAL MEDICINE

## 2017-11-28 PROCEDURE — 85027 COMPLETE CBC AUTOMATED: CPT | Performed by: INTERNAL MEDICINE

## 2017-11-28 PROCEDURE — 25010000002 MEROPENEM PER 100 MG: Performed by: INTERNAL MEDICINE

## 2017-11-28 RX ORDER — HALOPERIDOL 5 MG/1
5 TABLET ORAL EVERY 6 HOURS PRN
Status: DISCONTINUED | OUTPATIENT
Start: 2017-11-28 | End: 2017-11-29 | Stop reason: HOSPADM

## 2017-11-28 RX ORDER — LEVETIRACETAM 10 MG/ML
1000 INJECTION INTRAVASCULAR EVERY 12 HOURS SCHEDULED
Status: DISCONTINUED | OUTPATIENT
Start: 2017-11-28 | End: 2017-11-29

## 2017-11-28 RX ORDER — LEVETIRACETAM 500 MG/1
1000 TABLET ORAL EVERY 12 HOURS SCHEDULED
Status: DISCONTINUED | OUTPATIENT
Start: 2017-11-28 | End: 2017-11-28 | Stop reason: CLARIF

## 2017-11-28 RX ADMIN — MEROPENEM 1 G: 1 INJECTION, POWDER, FOR SOLUTION INTRAVENOUS at 06:13

## 2017-11-28 RX ADMIN — ENOXAPARIN SODIUM 40 MG: 40 INJECTION SUBCUTANEOUS at 15:42

## 2017-11-28 RX ADMIN — DEXTROSE AND SODIUM CHLORIDE 75 ML/HR: 5; 450 INJECTION, SOLUTION INTRAVENOUS at 03:49

## 2017-11-28 RX ADMIN — MEROPENEM 1 G: 1 INJECTION, POWDER, FOR SOLUTION INTRAVENOUS at 13:46

## 2017-11-28 RX ADMIN — LEVETIRACETAM 1000 MG: 1000 INJECTION, SOLUTION INTRAVENOUS at 09:30

## 2017-11-28 RX ADMIN — LEVETIRACETAM 1000 MG: 1000 INJECTION, SOLUTION INTRAVENOUS at 21:00

## 2017-11-29 ENCOUNTER — APPOINTMENT (OUTPATIENT)
Dept: GENERAL RADIOLOGY | Facility: HOSPITAL | Age: 62
End: 2017-11-29

## 2017-11-29 ENCOUNTER — HOSPITAL ENCOUNTER (INPATIENT)
Facility: HOSPITAL | Age: 62
LOS: 9 days | Discharge: HOME | End: 2017-12-08
Attending: SPECIALIST | Admitting: SPECIALIST

## 2017-11-29 VITALS
RESPIRATION RATE: 16 BRPM | DIASTOLIC BLOOD PRESSURE: 108 MMHG | TEMPERATURE: 98.6 F | OXYGEN SATURATION: 96 % | HEIGHT: 70 IN | SYSTOLIC BLOOD PRESSURE: 163 MMHG | BODY MASS INDEX: 19.16 KG/M2 | WEIGHT: 133.82 LBS | HEART RATE: 86 BPM

## 2017-11-29 PROBLEM — F20.0 CHRONIC PARANOID SCHIZOPHRENIA (HCC): Status: ACTIVE | Noted: 2017-11-29

## 2017-11-29 LAB
ANION GAP SERPL CALCULATED.3IONS-SCNC: 12.9 MMOL/L
BACTERIA SPEC AEROBE CULT: NORMAL
BACTERIA SPEC AEROBE CULT: NORMAL
BASOPHILS # BLD AUTO: 0.02 10*3/MM3 (ref 0–0.2)
BASOPHILS NFR BLD AUTO: 0.3 % (ref 0–1.5)
BUN BLD-MCNC: 9 MG/DL (ref 8–23)
BUN/CREAT SERPL: 14.5 (ref 7–25)
CALCIUM SPEC-SCNC: 9.7 MG/DL (ref 8.6–10.5)
CHLORIDE SERPL-SCNC: 104 MMOL/L (ref 98–107)
CO2 SERPL-SCNC: 24.1 MMOL/L (ref 22–29)
CREAT BLD-MCNC: 0.62 MG/DL (ref 0.57–1)
DEPRECATED RDW RBC AUTO: 46.6 FL (ref 37–54)
EOSINOPHIL # BLD AUTO: 0.1 10*3/MM3 (ref 0–0.7)
EOSINOPHIL NFR BLD AUTO: 1.6 % (ref 0.3–6.2)
ERYTHROCYTE [DISTWIDTH] IN BLOOD BY AUTOMATED COUNT: 14 % (ref 11.7–13)
GFR SERPL CREATININE-BSD FRML MDRD: 118 ML/MIN/1.73
GLUCOSE BLD-MCNC: 89 MG/DL (ref 65–99)
GLUCOSE BLDC GLUCOMTR-MCNC: 101 MG/DL (ref 70–130)
GLUCOSE BLDC GLUCOMTR-MCNC: 106 MG/DL (ref 70–130)
GLUCOSE BLDC GLUCOMTR-MCNC: 107 MG/DL (ref 70–130)
GLUCOSE BLDC GLUCOMTR-MCNC: 175 MG/DL (ref 70–130)
HCT VFR BLD AUTO: 34.8 % (ref 35.6–45.5)
HGB BLD-MCNC: 11.5 G/DL (ref 11.9–15.5)
IMM GRANULOCYTES # BLD: 0 10*3/MM3 (ref 0–0.03)
IMM GRANULOCYTES NFR BLD: 0 % (ref 0–0.5)
LYMPHOCYTES # BLD AUTO: 2.13 10*3/MM3 (ref 0.9–4.8)
LYMPHOCYTES NFR BLD AUTO: 34.6 % (ref 19.6–45.3)
MCH RBC QN AUTO: 30.4 PG (ref 26.9–32)
MCHC RBC AUTO-ENTMCNC: 33 G/DL (ref 32.4–36.3)
MCV RBC AUTO: 92.1 FL (ref 80.5–98.2)
MONOCYTES # BLD AUTO: 1.05 10*3/MM3 (ref 0.2–1.2)
MONOCYTES NFR BLD AUTO: 17 % (ref 5–12)
NEUTROPHILS # BLD AUTO: 2.86 10*3/MM3 (ref 1.9–8.1)
NEUTROPHILS NFR BLD AUTO: 46.5 % (ref 42.7–76)
PLATELET # BLD AUTO: 285 10*3/MM3 (ref 140–500)
PMV BLD AUTO: 11.3 FL (ref 6–12)
POTASSIUM BLD-SCNC: 3.7 MMOL/L (ref 3.5–5.2)
PROCALCITONIN SERPL-MCNC: 0.04 NG/ML (ref 0.1–0.25)
RBC # BLD AUTO: 3.78 10*6/MM3 (ref 3.9–5.2)
SODIUM BLD-SCNC: 141 MMOL/L (ref 136–145)
WBC NRBC COR # BLD: 6.16 10*3/MM3 (ref 4.5–10.7)

## 2017-11-29 PROCEDURE — 84145 PROCALCITONIN (PCT): CPT | Performed by: INTERNAL MEDICINE

## 2017-11-29 PROCEDURE — 93005 ELECTROCARDIOGRAM TRACING: CPT | Performed by: SPECIALIST

## 2017-11-29 PROCEDURE — 93010 ELECTROCARDIOGRAM REPORT: CPT | Performed by: INTERNAL MEDICINE

## 2017-11-29 PROCEDURE — 85025 COMPLETE CBC W/AUTO DIFF WBC: CPT | Performed by: INTERNAL MEDICINE

## 2017-11-29 PROCEDURE — 82962 GLUCOSE BLOOD TEST: CPT

## 2017-11-29 PROCEDURE — 71010 HC CHEST PA OR AP: CPT

## 2017-11-29 PROCEDURE — 80048 BASIC METABOLIC PNL TOTAL CA: CPT | Performed by: INTERNAL MEDICINE

## 2017-11-29 PROCEDURE — 25010000002 MEROPENEM PER 100 MG: Performed by: INTERNAL MEDICINE

## 2017-11-29 RX ORDER — AMLODIPINE BESYLATE 5 MG/1
5 TABLET ORAL
Status: DISCONTINUED | OUTPATIENT
Start: 2017-11-29 | End: 2017-11-29

## 2017-11-29 RX ORDER — LEVETIRACETAM 500 MG/1
1000 TABLET ORAL EVERY 12 HOURS SCHEDULED
Status: DISCONTINUED | OUTPATIENT
Start: 2017-11-29 | End: 2017-11-29 | Stop reason: HOSPADM

## 2017-11-29 RX ORDER — ALUMINA, MAGNESIA, AND SIMETHICONE 2400; 2400; 240 MG/30ML; MG/30ML; MG/30ML
15 SUSPENSION ORAL EVERY 6 HOURS PRN
Status: DISCONTINUED | OUTPATIENT
Start: 2017-11-29 | End: 2017-12-08 | Stop reason: HOSPADM

## 2017-11-29 RX ORDER — NAPROXEN 500 MG/1
250 TABLET ORAL 2 TIMES DAILY PRN
Status: DISCONTINUED | OUTPATIENT
Start: 2017-11-29 | End: 2017-11-29 | Stop reason: HOSPADM

## 2017-11-29 RX ORDER — AMLODIPINE BESYLATE 10 MG/1
10 TABLET ORAL
Qty: 30 TABLET | Refills: 0 | Status: SHIPPED | OUTPATIENT
Start: 2017-11-30 | End: 2017-12-08 | Stop reason: HOSPADM

## 2017-11-29 RX ORDER — LEVETIRACETAM 1000 MG/1
1000 TABLET ORAL EVERY 12 HOURS SCHEDULED
Qty: 60 TABLET | Refills: 0 | Status: SHIPPED | OUTPATIENT
Start: 2017-11-29 | End: 2017-12-08 | Stop reason: HOSPADM

## 2017-11-29 RX ORDER — ACETAMINOPHEN 325 MG/1
650 TABLET ORAL EVERY 4 HOURS PRN
Qty: 100 TABLET | Refills: 0 | Status: SHIPPED | OUTPATIENT
Start: 2017-11-29 | End: 2017-12-08 | Stop reason: HOSPADM

## 2017-11-29 RX ORDER — LEVETIRACETAM 500 MG/1
1000 TABLET ORAL EVERY 12 HOURS SCHEDULED
Status: DISCONTINUED | OUTPATIENT
Start: 2017-11-29 | End: 2017-12-08 | Stop reason: HOSPADM

## 2017-11-29 RX ORDER — NAPROXEN 250 MG/1
250 TABLET ORAL 2 TIMES DAILY PRN
Qty: 60 TABLET | Refills: 0 | Status: SHIPPED | OUTPATIENT
Start: 2017-11-29

## 2017-11-29 RX ORDER — NAPROXEN 500 MG/1
250 TABLET ORAL 2 TIMES DAILY WITH MEALS
Status: DISCONTINUED | OUTPATIENT
Start: 2017-11-29 | End: 2017-12-08 | Stop reason: HOSPADM

## 2017-11-29 RX ORDER — AMLODIPINE BESYLATE 5 MG/1
5 TABLET ORAL ONCE
Status: COMPLETED | OUTPATIENT
Start: 2017-11-29 | End: 2017-11-29

## 2017-11-29 RX ORDER — HALOPERIDOL 5 MG/1
5 TABLET ORAL EVERY 6 HOURS PRN
Qty: 60 TABLET | Refills: 0 | Status: SHIPPED | OUTPATIENT
Start: 2017-11-29 | End: 2017-12-08 | Stop reason: HOSPADM

## 2017-11-29 RX ORDER — ONDANSETRON 4 MG/1
4 TABLET, ORALLY DISINTEGRATING ORAL EVERY 6 HOURS PRN
Status: DISCONTINUED | OUTPATIENT
Start: 2017-11-29 | End: 2017-12-08 | Stop reason: HOSPADM

## 2017-11-29 RX ORDER — AMLODIPINE BESYLATE 10 MG/1
10 TABLET ORAL
Status: DISCONTINUED | OUTPATIENT
Start: 2017-11-30 | End: 2017-12-08 | Stop reason: HOSPADM

## 2017-11-29 RX ORDER — ZIPRASIDONE MESYLATE 20 MG/ML
20 INJECTION, POWDER, LYOPHILIZED, FOR SOLUTION INTRAMUSCULAR EVERY 6 HOURS PRN
Status: DISCONTINUED | OUTPATIENT
Start: 2017-11-29 | End: 2017-12-08 | Stop reason: HOSPADM

## 2017-11-29 RX ORDER — LABETALOL HYDROCHLORIDE 5 MG/ML
20 INJECTION, SOLUTION INTRAVENOUS ONCE
Status: COMPLETED | OUTPATIENT
Start: 2017-11-29 | End: 2017-11-29

## 2017-11-29 RX ORDER — ACETAMINOPHEN 325 MG/1
650 TABLET ORAL EVERY 4 HOURS PRN
Status: DISCONTINUED | OUTPATIENT
Start: 2017-11-29 | End: 2017-12-08 | Stop reason: HOSPADM

## 2017-11-29 RX ORDER — AMLODIPINE BESYLATE 10 MG/1
10 TABLET ORAL
Status: DISCONTINUED | OUTPATIENT
Start: 2017-11-30 | End: 2017-11-29 | Stop reason: HOSPADM

## 2017-11-29 RX ADMIN — AMLODIPINE BESYLATE 5 MG: 5 TABLET ORAL at 16:33

## 2017-11-29 RX ADMIN — MEROPENEM 1 G: 1 INJECTION, POWDER, FOR SOLUTION INTRAVENOUS at 06:30

## 2017-11-29 RX ADMIN — NAPROXEN 250 MG: 500 TABLET ORAL at 22:03

## 2017-11-29 RX ADMIN — AMLODIPINE BESYLATE 5 MG: 5 TABLET ORAL at 09:53

## 2017-11-29 RX ADMIN — LEVETIRACETAM 1000 MG: 500 TABLET, FILM COATED ORAL at 09:53

## 2017-11-29 RX ADMIN — NAPROXEN 250 MG: 500 TABLET ORAL at 12:30

## 2017-11-29 RX ADMIN — LEVETIRACETAM 1000 MG: 500 TABLET, FILM COATED ORAL at 22:04

## 2017-11-29 RX ADMIN — ACETAMINOPHEN 650 MG: 325 TABLET ORAL at 09:53

## 2017-11-29 RX ADMIN — LABETALOL HYDROCHLORIDE 20 MG: 5 INJECTION, SOLUTION INTRAVENOUS at 14:10

## 2017-11-30 RX ADMIN — LEVETIRACETAM 1000 MG: 500 TABLET, FILM COATED ORAL at 09:20

## 2017-11-30 RX ADMIN — NAPROXEN 250 MG: 500 TABLET ORAL at 09:20

## 2017-11-30 RX ADMIN — AMLODIPINE BESYLATE 10 MG: 10 TABLET ORAL at 09:21

## 2017-11-30 RX ADMIN — NAPROXEN 250 MG: 500 TABLET ORAL at 19:06

## 2017-11-30 RX ADMIN — LEVETIRACETAM 1000 MG: 500 TABLET, FILM COATED ORAL at 21:25

## 2017-12-01 RX ADMIN — NAPROXEN 250 MG: 500 TABLET ORAL at 08:34

## 2017-12-01 RX ADMIN — AMLODIPINE BESYLATE 10 MG: 10 TABLET ORAL at 08:34

## 2017-12-01 RX ADMIN — LEVETIRACETAM 1000 MG: 500 TABLET, FILM COATED ORAL at 21:26

## 2017-12-01 RX ADMIN — NAPROXEN 250 MG: 500 TABLET ORAL at 19:07

## 2017-12-01 RX ADMIN — LEVETIRACETAM 1000 MG: 500 TABLET, FILM COATED ORAL at 08:34

## 2017-12-01 NOTE — PROGRESS NOTES
The patient is pleasant and cooperative with staff.  [facility] has been helpful with the patient's homeless status and we have contacted Adult Protective Services but is generally the case with that organization they have offered little assistance.  We continue to have grave concerns regarding the patient's safety outside the hospital.  It remains notable that the patient very nearly  from hypothermia after having been discharged from [facility].  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  At this point we await word from [facility] regarding possible initiation of the patient's previously prescribed psychotropic medications.  We continue to have when necessary medication available should the patient have any recurrence of the agitation and paranoia that characterized her stay in the intensive care unit where she was treated for a severe case of pneumonia related to the circumstances of her hospitalization.

## 2017-12-01 NOTE — PROGRESS NOTES
Continued Stay Note  [facility]     Patient Name: Imelda Lopez  MRN: [id number]  Today's Date: 12/1/2017    Admit Date: 11/29/2017          Discharge Plan       12/01/17 1510    Case Management/Social Work Plan    Additional Comments SW received a vm message from Marisel Manuel,  at [facility] stating that pt has an appt [facility] for 12/12@9:30 am.  She also stated that she looked for placement but due to it being the weekend, she was not able to secure placement.  Ms. RILEYLanaCoretta stated that pt gets a disability check and may be able to pay to stay at a hotel if she is discharged.              Discharge Codes     None            KJ Mendoza

## 2017-12-01 NOTE — PLAN OF CARE
Problem:  Patient Care Overview (Adult)  Goal: Plan of Care Review  Outcome: Ongoing (interventions implemented as appropriate)    12/01/17 0050 12/01/17 0444   Patient Care Overview   Progress --  improving   Outcome Evaluation   Outcome Summary/Follow up Plan --  Pt is pleasant and cooperative this shift. Pt rates anxiety 2/10, denies depression and pain. Will continue to monitor for safety.   Coping/Psychosocial Response Interventions   Plan Of Care Reviewed With patient --    Coping/Psychosocial   Patient Agreement with Plan of Care agrees --          Problem:  Overarching Goals  Goal: Adheres to Safety Considerations for Self and Others  Outcome: Ongoing (interventions implemented as appropriate)  Goal: Optimized Coping Skills in Response to Life Stressors  Outcome: Ongoing (interventions implemented as appropriate)  Goal: Develops/Participates in Therapeutic [place] to Support Successful Transition  Outcome: Ongoing (interventions implemented as appropriate)

## 2017-12-02 RX ADMIN — NAPROXEN 250 MG: 500 TABLET ORAL at 08:40

## 2017-12-02 RX ADMIN — LEVETIRACETAM 1000 MG: 500 TABLET, FILM COATED ORAL at 21:11

## 2017-12-02 RX ADMIN — LEVETIRACETAM 1000 MG: 500 TABLET, FILM COATED ORAL at 08:38

## 2017-12-02 RX ADMIN — NAPROXEN 250 MG: 500 TABLET ORAL at 21:11

## 2017-12-02 RX ADMIN — AMLODIPINE BESYLATE 10 MG: 10 TABLET ORAL at 08:38

## 2017-12-02 NOTE — PLAN OF CARE
Problem:  Patient Care Overview (Adult)  Goal: Plan of Care Review  Outcome: Ongoing (interventions implemented as appropriate)    12/02/17 0400 12/02/17 0644   Patient Care Overview   Progress --  improving   Outcome Evaluation   Outcome Summary/Follow up Plan --  Anxiety rated 2/10. Denied depression and SI/HI. Cooperative and med compliant. Will continue to monitor and assess.   Coping/Psychosocial Response Interventions   Plan Of Care Reviewed With patient --    Coping/Psychosocial   Patient Agreement with Plan of Care agrees --        Goal: Interdisciplinary Rounds/Family Conference  Outcome: Ongoing (interventions implemented as appropriate)  Goal: Individualization and Mutuality  Outcome: Ongoing (interventions implemented as appropriate)    11/30/17 1033 11/30/17 1828   Behavioral Health Screens   Patient Personal Strengths --  expressive of emotions;expressive of needs;compliant with treatment/medications;motivated for treatment   Individualization   Patient Specific Goals In 12 days, patient will comply with medications and participate in therapeutic activities. --        Goal: Discharge Needs Assessment  Outcome: Ongoing (interventions implemented as appropriate)    Problem:  Overarching Goals  Goal: Adheres to Safety Considerations for Self and Others  Outcome: Ongoing (interventions implemented as appropriate)  Intervention: Develop/maintain Individualized Safety Plan    12/02/17 0400   Safety   Safety Measures safety rounds completed;suicide assessment completed   Mental Health Homicide Risk   Homicidal Ideation no   Provide Emotional/Physical Safety   Suicidal Ideation no         Goal: Optimized Coping Skills in Response to Life Stressors  Outcome: Ongoing (interventions implemented as appropriate)  Intervention: Promote Effective Coping Strategies    12/02/17 0400   Coping Strategies   Supportive Measures active listening utilized;positive reinforcement provided;self-care  encouraged;self-reflection promoted;self-responsibility promoted;verbalization of feelings encouraged         Goal: Develops/Participates in Therapeutic [place] to Support Successful Transition  Outcome: Ongoing (interventions implemented as appropriate)  Intervention: Foster Therapeutic [place]    12/02/17 0400   Coping Strategies   Trust Relationship/Rapport care explained;choices provided;emotional support provided;empathic listening provided;questions answered;questions encouraged;reassurance provided;thoughts/feelings acknowledged       Intervention: Mutually Develop Transition Plan    12/02/17 0400   OTHER   Transition Support crisis management plan promoted

## 2017-12-02 NOTE — PLAN OF CARE
Problem:  Patient Care Overview (Adult)  Goal: Plan of Care Review  Outcome: Ongoing (interventions implemented as appropriate)    12/01/17 0900 12/01/17 2030   Patient Care Overview   Progress --  improving   Outcome Evaluation   Outcome Summary/Follow up Plan --  Pt was pleasant and cooperative with staff today. Pt ddenies anxiety/depression this shift. Pt compliant with meds. Will continue to monitor 12/1/17 1930   Coping/Psychosocial Response Interventions   Plan Of Care Reviewed With patient --    Coping/Psychosocial   Patient Agreement with Plan of Care agrees --          Problem:  Overarching Goals  Goal: Adheres to Safety Considerations for Self and Others  Outcome: Ongoing (interventions implemented as appropriate)  Intervention: Develop/maintain Individualized Safety Plan    12/01/17 0900 12/01/17 1600   Safety   Safety Measures --  safety rounds completed   Mental Health Homicide Risk   Homicidal Ideation no --    Provide Emotional/Physical Safety   Suicidal Ideation no --          Goal: Optimized Coping Skills in Response to Life Stressors  Outcome: Ongoing (interventions implemented as appropriate)  Intervention: Promote Effective Coping Strategies    12/01/17 0900   Coping Strategies   Supportive Measures active listening utilized;verbalization of feelings encouraged;self-reflection promoted         Goal: Develops/Participates in Therapeutic [place] to Support Successful Transition  Outcome: Ongoing (interventions implemented as appropriate)  Intervention: Foster Therapeutic [place]    12/01/17 0900   Coping Strategies   Trust Relationship/Rapport care explained;choices provided;emotional support provided;empathic listening provided;reassurance provided;questions answered;questions encouraged;thoughts/feelings acknowledged       Intervention: Mutually Develop Transition Plan    12/01/17 0900   OTHER   Transition Support crisis management plan promoted           Problem: Suicide Risk  (Adult,Obstetrics,Pediatric)  Intervention: Promote/Enhance Psychosocial Well-being    12/01/17 0900   Coping Strategies   Supportive Measures active listening utilized;verbalization of feelings encouraged;self-reflection promoted   Family/Support System Care self-care encouraged       Intervention: Assess Risk to Self/Maintain Safety    12/01/17 0900   Safety Interventions   Safety/Security Measures bed alarm set   Coping/Psychosocial Interventions   Behavior Management behavioral plan reviewed   Provide Emotional/Physical Safety   Suicidal Ideation no         Goal: Identify Related Risk Factors and Signs and Symptoms  Outcome: Ongoing (interventions implemented as appropriate)    12/01/17 2030   Suicide Risk   Suicide Risk: Related Risk Factors terminal/chronic illness   Signs and Symptoms (Suicide Risk) hallucinations/delusions       Goal: Strength-Based Wellness/Recovery  Outcome: Ongoing (interventions implemented as appropriate)    12/01/17 2030   Suicide Risk (Adult,Obstetrics,Pediatric)   Strength-Based Wellness/Recovery making progress toward outcome       Goal: Physical Safety  Outcome: Ongoing (interventions implemented as appropriate)    12/01/17 2030   Suicide Risk (Adult,Obstetrics,Pediatric)   Physical Safety making progress toward outcome         Problem: Risk for Self Injury/Neglect  Goal: Treatment Goal: Remain safe during length of stay, learn and adopt new coping skills, and be free of self-injurious ideation, impulses and acts at the time of discharge  Outcome: Ongoing (interventions implemented as appropriate)  Goal: Verbalize thoughts and feelings associated with:  Outcome: Ongoing (interventions implemented as appropriate)  Goal: Refrain from harming self  Outcome: Ongoing (interventions implemented as appropriate)  Goal: Attend and participate in unit activities, including therapeutic, recreational, and educational groups  Outcome: Ongoing (interventions implemented as appropriate)  Goal:  Recognize maladaptive responses and adopt new coping mechanisms  Outcome: Ongoing (interventions implemented as appropriate)  Goal: Complete daily ADLs, including personal hygiene independently, as able  Outcome: Ongoing (interventions implemented as appropriate)

## 2017-12-02 NOTE — PROGRESS NOTES
The patient remains pleasant cooperative with care and offers no new complaints when seen today.  At this point she exhibits no acute symptoms of psychosis or depression, and I see no reason to initiate Haldol Celexa or any other medications at this time related there to.  Weight word from [facility] regarding the patient's disposition status.

## 2017-12-03 RX ADMIN — AMLODIPINE BESYLATE 10 MG: 10 TABLET ORAL at 09:25

## 2017-12-03 RX ADMIN — NAPROXEN 250 MG: 500 TABLET ORAL at 20:51

## 2017-12-03 RX ADMIN — LEVETIRACETAM 1000 MG: 500 TABLET, FILM COATED ORAL at 20:53

## 2017-12-03 RX ADMIN — NAPROXEN 250 MG: 500 TABLET ORAL at 09:24

## 2017-12-03 RX ADMIN — LEVETIRACETAM 1000 MG: 500 TABLET, FILM COATED ORAL at 09:24

## 2017-12-03 NOTE — PROGRESS NOTES
The patient remains pleasant and cooperative interactions with peers and staff.  She is exhibiting no signs of psychosis and does not appear to be dysphoric or depressed and anyway.  At this point, we continue to await word from [facility] regarding their input over disposition issues.

## 2017-12-03 NOTE — PLAN OF CARE
Problem:  Patient Care Overview (Adult)  Goal: Plan of Care Review  Outcome: Ongoing (interventions implemented as appropriate)    12/03/17 1500 12/03/17 1834   Patient Care Overview   Progress --  improving   Outcome Evaluation   Outcome Summary/Follow up Plan --  pleasant and calm sitting in the milieu or napping.    Coping/Psychosocial Response Interventions   Plan Of Care Reviewed With patient --    Coping/Psychosocial   Patient Agreement with Plan of Care agrees --          Problem:  Overarching Goals  Goal: Adheres to Safety Considerations for Self and Others  Outcome: Ongoing (interventions implemented as appropriate)  Intervention: Develop/maintain Individualized Safety Plan    12/03/17 1500 12/03/17 1600   Safety   Safety Measures --  safety rounds completed   Provide Emotional/Physical Safety   Suicidal Ideation no --    Mental Health Homicide Risk   Homicidal Ideation no --          Goal: Optimized Coping Skills in Response to Life Stressors  Outcome: Ongoing (interventions implemented as appropriate)  Intervention: Promote Effective Coping Strategies    12/03/17 1500   Coping Strategies   Supportive Measures active listening utilized;verbalization of feelings encouraged         Goal: Develops/Participates in Therapeutic [place] to Support Successful Transition  Outcome: Ongoing (interventions implemented as appropriate)  Intervention: Foster Therapeutic [place]    12/03/17 1500   Coping Strategies   Trust Relationship/Rapport care explained;choices provided;emotional support provided;questions answered;reassurance provided;thoughts/feelings acknowledged       Intervention: Mutually Develop Transition Plan    12/03/17 1500   OTHER   Transition Support crisis management plan promoted

## 2017-12-03 NOTE — PLAN OF CARE
Problem:  Patient Care Overview (Adult)  Goal: Plan of Care Review  Outcome: Ongoing (interventions implemented as appropriate)    12/02/17 1300 12/02/17 1904   Patient Care Overview   Progress --  improving   Outcome Evaluation   Outcome Summary/Follow up Plan --  pleasant and cooperative. Sits quietly in the milieu. Oriented . No complaints or concerns voiced   Coping/Psychosocial Response Interventions   Plan Of Care Reviewed With patient --    Coping/Psychosocial   Patient Agreement with Plan of Care agrees --          12/02/17 1300 12/02/17 1904   Patient Care Overview   Progress --  improving   Outcome Evaluation   Outcome Summary/Follow up Plan --  pleasant and cooperative. Sits quietly in the milieu. Oriented . No complaints or concerns voiced   Coping/Psychosocial Response Interventions   Plan Of Care Reviewed With patient --    Coping/Psychosocial   Patient Agreement with Plan of Care agrees --          Problem:  Overarching Goals  Goal: Adheres to Safety Considerations for Self and Others  Outcome: Ongoing (interventions implemented as appropriate)  Goal: Optimized Coping Skills in Response to Life Stressors  Outcome: Ongoing (interventions implemented as appropriate)  Intervention: Promote Effective Coping Strategies    12/02/17 0400   Coping Strategies   Supportive Measures active listening utilized;positive reinforcement provided;self-care encouraged;self-reflection promoted;self-responsibility promoted;verbalization of feelings encouraged         Goal: Develops/Participates in Therapeutic [place] to Support Successful Transition  Outcome: Ongoing (interventions implemented as appropriate)  Intervention: Foster Therapeutic [place]    12/02/17 0400   Coping Strategies   Trust Relationship/Rapport care explained;choices provided;emotional support provided;empathic listening provided;questions answered;questions encouraged;reassurance provided;thoughts/feelings acknowledged       Intervention:  Mutually Develop Transition Plan    12/02/17 7689   OTHER   Transition Support crisis management plan promoted

## 2017-12-03 NOTE — PLAN OF CARE
Problem:  Patient Care Overview (Adult)  Goal: Plan of Care Review  Outcome: Ongoing (interventions implemented as appropriate)    12/02/17 2150 12/03/17 0356   Patient Care Overview   Progress --  improving   Outcome Evaluation   Outcome Summary/Follow up Plan --  Pt. calm,cooperative and medication compliant. Pt. out in dayroom for snacks no peer interaction noted. Pt. denies anxiety , depression, HI,SI and hallucinations at this time. Pt. in bed with eyes closed no concerns noted or reported will continue to provide a safe environment.    Coping/Psychosocial Response Interventions   Plan Of Care Reviewed With patient --    Coping/Psychosocial   Patient Agreement with Plan of Care agrees --          Problem:  Overarching Goals  Goal: Adheres to Safety Considerations for Self and Others  Outcome: Ongoing (interventions implemented as appropriate)  Intervention: Develop/maintain Individualized Safety Plan    12/02/17 2150 12/03/17 0356   Safety   Safety Measures --  safety rounds completed   Provide Emotional/Physical Safety   Suicidal Ideation no --    Willingness to Contact Staff Member if Feeling Like Hurting Self --  yes   Mental Health Homicide Risk   Homicidal Ideation no --    Willingness to Contact Staff Member if Feeling Like Hurting Others --  yes         Goal: Optimized Coping Skills in Response to Life Stressors  Outcome: Ongoing (interventions implemented as appropriate)  Intervention: Promote Effective Coping Strategies    12/02/17 2150   Coping Strategies   Supportive Measures active listening utilized         Goal: Develops/Participates in Therapeutic [place] to Support Successful Transition  Outcome: Ongoing (interventions implemented as appropriate)  Intervention: Foster Therapeutic [place]    12/02/17 2150   Coping Strategies   Trust Relationship/Rapport care explained       Intervention: Mutually Develop Transition Plan    12/02/17 2150   OTHER   Transition Support crisis management plan  promoted

## 2017-12-04 RX ADMIN — NAPROXEN 250 MG: 500 TABLET ORAL at 08:18

## 2017-12-04 RX ADMIN — AMLODIPINE BESYLATE 10 MG: 10 TABLET ORAL at 08:17

## 2017-12-04 RX ADMIN — NAPROXEN 250 MG: 500 TABLET ORAL at 17:46

## 2017-12-04 RX ADMIN — LEVETIRACETAM 1000 MG: 500 TABLET, FILM COATED ORAL at 21:47

## 2017-12-04 RX ADMIN — LEVETIRACETAM 1000 MG: 500 TABLET, FILM COATED ORAL at 08:17

## 2017-12-04 NOTE — PLAN OF CARE
Problem: [facility] Patient Care Overview (Adult)  Goal: Interdisciplinary Rounds/Family Conference  Outcome: Ongoing (interventions implemented as appropriate)    12/04/17 1237   Interdisciplinary Rounds/Family Conf   Summary Treatment team met to review pt's plan of care. Tiffany and FLORA are in the process of locating housing for the pt. Pt will have to go to a temporary discharge placement until she have an appt w[facility] which is scheduled for 12/12. Pt's progress will be reviewed on an ongoing basis.   Participants art therapy;;nursing;pastoral care;psychiatrist;social work;pharmacy      Patient/Guardian Signature: __________________________________             Psychiatrist Signature: ______________________________________             Therapist Signature: ________________________________________              Nurse Signature: ___________________________________________              Staff Signature: ____________________________________________             Staff Signature: ____________________________________________              Staff Signature: ____________________________________________              Staff Signature:                                                                                                        Problem: Risk for Self Injury/Neglect  Goal: Treatment Goal: Remain safe during length of stay, learn and adopt new coping skills, and be free of self-injurious ideation, impulses and acts at the time of discharge  Outcome: Ongoing (interventions implemented as appropriate)  Goal: Verbalize thoughts and feelings associated with:  Outcome: Ongoing (interventions implemented as appropriate)  Goal: Refrain from harming self  Outcome: Ongoing (interventions implemented as appropriate)  Goal: Attend and participate in unit activities, including therapeutic, recreational, and educational groups  Outcome: Ongoing (interventions implemented as appropriate)  Goal: Recognize  maladaptive responses and adopt new coping mechanisms  Outcome: Ongoing (interventions implemented as appropriate)  Goal: Complete daily ADLs, including personal hygiene independently, as able  Outcome: Ongoing (interventions implemented as appropriate)

## 2017-12-04 NOTE — PLAN OF CARE
Problem:  Patient Care Overview (Adult)  Goal: Plan of Care Review  Outcome: Ongoing (interventions implemented as appropriate)    12/04/17 0200 12/04/17 0417   Patient Care Overview   Progress --  improving   Outcome Evaluation   Outcome Summary/Follow up Plan --  Pt. calm, cooperative and medication compliant. Pt. out in dayroom for snack no social interaction noted . Pt. denies SI,HI no anxiety or depression noted or reported by pt. at this time . Pt. in bed with eyes closed will contiue to monitor and provide a safe environment.   Coping/Psychosocial Response Interventions   Plan Of Care Reviewed With patient --    Coping/Psychosocial   Patient Agreement with Plan of Care agrees --          Problem:  Overarching Goals  Goal: Adheres to Safety Considerations for Self and Others  Outcome: Ongoing (interventions implemented as appropriate)  Intervention: Develop/maintain Individualized Safety Plan    12/03/17 2100 12/04/17 0200   Safety   Safety Measures safety rounds completed --    Provide Emotional/Physical Safety   Suicidal Ideation --  no   Willingness to Contact Staff Member if Feeling Like Hurting Self --  yes   Mental Health Homicide Risk   Homicidal Ideation --  no   Willingness to Contact Staff Member if Feeling Like Hurting Others --  yes         Goal: Optimized Coping Skills in Response to Life Stressors  Outcome: Ongoing (interventions implemented as appropriate)  Intervention: Promote Effective Coping Strategies    12/04/17 0200   Coping Strategies   Supportive Measures active listening utilized         Goal: Develops/Participates in Therapeutic [place] to Support Successful Transition  Outcome: Ongoing (interventions implemented as appropriate)  Intervention: Foster Therapeutic [place]    12/04/17 0200   Coping Strategies   Trust Relationship/Rapport care explained       Intervention: Mutually Develop Transition Plan    12/04/17 0200   OTHER   Transition Support crisis management plan promoted

## 2017-12-04 NOTE — PROGRESS NOTES
The patient remains pleasant interactions with peers and staff.  She exhibits no signs of psychosis and at this point we have not reinitiated any antidepressant or antipsychotic medications.  We await word from community mental health resources regarding her disposition.

## 2017-12-04 NOTE — PLAN OF CARE
Problem:  Patient Care Overview (Adult)  Goal: Plan of Care Review  Outcome: Ongoing (interventions implemented as appropriate)    12/04/17 0817 12/04/17 8821   Patient Care Overview   Progress --  no change   Outcome Evaluation   Outcome Summary/Follow up Plan --  Patient has been pleasent and cooperative with medication. She denied anxiety/depression/SI/HI and hallucinations on assessment. She did however appear slightly paranoid covering up her food with a napkin when she wasn't eating and later in the day asking for a face mask and wearing in while in the milieu. Her affect is flat and quiet. Will continue to monitor and provide support.    Coping/Psychosocial Response Interventions   Plan Of Care Reviewed With patient --    Coping/Psychosocial   Patient Agreement with Plan of Care agrees --        Goal: Interdisciplinary Rounds/Family Conference  Outcome: Ongoing (interventions implemented as appropriate)  Goal: Individualization and Mutuality  Outcome: Ongoing (interventions implemented as appropriate)  Goal: Discharge Needs Assessment  Outcome: Ongoing (interventions implemented as appropriate)    Problem:  Overarching Goals  Goal: Adheres to Safety Considerations for Self and Others  Outcome: Ongoing (interventions implemented as appropriate)  Intervention: Develop/maintain Individualized Safety Plan    12/04/17 0817   Safety   Safety Measures safety rounds completed;suicide assessment completed   Provide Emotional/Physical Safety   Suicidal Ideation no   Willingness to Contact Staff Member if Feeling Like Hurting Self yes   Mental Health Homicide Risk   Homicidal Ideation no   Willingness to Contact Staff Member if Feeling Like Hurting Others yes         Goal: Optimized Coping Skills in Response to Life Stressors  Outcome: Ongoing (interventions implemented as appropriate)  Intervention: Promote Effective Coping Strategies    12/04/17 0817   Coping Strategies   Supportive Measures active listening  utilized;positive reinforcement provided;verbalization of feelings encouraged         Goal: Develops/Participates in Therapeutic [place] to Support Successful Transition  Outcome: Ongoing (interventions implemented as appropriate)  Intervention: Foster Therapeutic [place]    12/04/17 0817   Coping Strategies   Trust Relationship/Rapport care explained;choices provided;emotional support provided;empathic listening provided;questions answered;questions encouraged;reassurance provided;thoughts/feelings acknowledged       Intervention: Mutually Develop Transition Plan    12/04/17 0817   OTHER   Transition Support crisis management plan promoted           Problem: Suicide Risk (Adult,Obstetrics,Pediatric)  Intervention: Promote/Enhance Psychosocial Well-being    12/04/17 0200 12/04/17 0817   Coping Strategies   Supportive Measures --  active listening utilized;positive reinforcement provided;verbalization of feelings encouraged   Family/Support System Care self-care encouraged --        Intervention: Assess Risk to Self/Maintain Safety    12/02/17 2150 12/04/17 0817   Provide Emotional/Physical Safety   Suicidal Ideation --  no   Willingness to Contact Staff Member if Feeling Like Hurting Self --  yes   Safety Interventions   Safety/Security Measures bed alarm set --    Coping/Psychosocial Interventions   Behavior Management --  behavioral plan reviewed         Goal: Identify Related Risk Factors and Signs and Symptoms  Outcome: Ongoing (interventions implemented as appropriate)  Goal: Strength-Based Wellness/Recovery  Outcome: Ongoing (interventions implemented as appropriate)    12/04/17 1659   Suicide Risk (Adult,Obstetrics,Pediatric)   Strength-Based Wellness/Recovery making progress toward outcome       Goal: Physical Safety  Outcome: Ongoing (interventions implemented as appropriate)    12/04/17 1659   Suicide Risk (Adult,Obstetrics,Pediatric)   Physical Safety making progress toward outcome         Problem: Risk  for Self Injury/Neglect  Goal: Treatment Goal: Remain safe during length of stay, learn and adopt new coping skills, and be free of self-injurious ideation, impulses and acts at the time of discharge  Outcome: Ongoing (interventions implemented as appropriate)  Goal: Verbalize thoughts and feelings associated with:  Outcome: Ongoing (interventions implemented as appropriate)  Goal: Refrain from harming self  Outcome: Ongoing (interventions implemented as appropriate)  Goal: Attend and participate in unit activities, including therapeutic, recreational, and educational groups  Outcome: Ongoing (interventions implemented as appropriate)  Goal: Recognize maladaptive responses and adopt new coping mechanisms  Outcome: Ongoing (interventions implemented as appropriate)  Goal: Complete daily ADLs, including personal hygiene independently, as able  Outcome: Ongoing (interventions implemented as appropriate)

## 2017-12-05 RX ORDER — FERROUS SULFATE 325(65) MG
325 TABLET ORAL
Status: DISCONTINUED | OUTPATIENT
Start: 2017-12-05 | End: 2017-12-08 | Stop reason: HOSPADM

## 2017-12-05 RX ADMIN — CALCIUM CARBONATE-VITAMIN D TAB 500 MG-200 UNIT 500 MG: 500-200 TAB at 09:57

## 2017-12-05 RX ADMIN — FERROUS SULFATE TAB 325 MG (65 MG ELEMENTAL FE) 325 MG: 325 (65 FE) TAB at 12:53

## 2017-12-05 RX ADMIN — LEVETIRACETAM 1000 MG: 500 TABLET, FILM COATED ORAL at 09:57

## 2017-12-05 RX ADMIN — NAPROXEN 250 MG: 500 TABLET ORAL at 09:56

## 2017-12-05 RX ADMIN — NAPROXEN 250 MG: 500 TABLET ORAL at 17:45

## 2017-12-05 RX ADMIN — AMLODIPINE BESYLATE 10 MG: 10 TABLET ORAL at 09:57

## 2017-12-05 RX ADMIN — LEVETIRACETAM 1000 MG: 500 TABLET, FILM COATED ORAL at 20:23

## 2017-12-05 NOTE — PLAN OF CARE
Problem:  Patient Care Overview (Adult)  Goal: Plan of Care Review  Outcome: Ongoing (interventions implemented as appropriate)    12/05/17 0415 12/05/17 0506   Patient Care Overview   Progress --  no change   Outcome Evaluation   Outcome Summary/Follow up Plan --  Anxiety rated 3/10, depression 2/10, and no SI/HI. Pt guarded, and flat affect noted. Cooperative and med compliant. Will continue to monitor and assess.   Coping/Psychosocial Response Interventions   Plan Of Care Reviewed With patient --    Coping/Psychosocial   Patient Agreement with Plan of Care agrees --        Goal: Interdisciplinary Rounds/Family Conference  Outcome: Ongoing (interventions implemented as appropriate)  Goal: Individualization and Mutuality  Outcome: Ongoing (interventions implemented as appropriate)    11/30/17 1033 11/30/17 1828   Behavioral Health Screens   Patient Personal Strengths --  expressive of emotions;expressive of needs;compliant with treatment/medications;motivated for treatment   Individualization   Patient Specific Goals In 12 days, patient will comply with medications and participate in therapeutic activities. --        Goal: Discharge Needs Assessment  Outcome: Ongoing (interventions implemented as appropriate)    Problem:  Overarching Goals  Goal: Adheres to Safety Considerations for Self and Others  Outcome: Ongoing (interventions implemented as appropriate)  Intervention: Develop/maintain Individualized Safety Plan    12/05/17 0415   Safety   Safety Measures safety rounds completed;suicide assessment completed   Provide Emotional/Physical Safety   Suicidal Ideation no   Willingness to Contact Staff Member if Feeling Like Hurting Self yes   Mental Health Homicide Risk   Homicidal Ideation no   Willingness to Contact Staff Member if Feeling Like Hurting Others yes         Goal: Optimized Coping Skills in Response to Life Stressors  Outcome: Ongoing (interventions implemented as appropriate)  Intervention:  Promote Effective Coping Strategies    12/05/17 0415   Coping Strategies   Supportive Measures active listening utilized;self-care encouraged;self-reflection promoted;self-responsibility promoted;verbalization of feelings encouraged         Goal: Develops/Participates in Therapeutic [place] to Support Successful Transition  Outcome: Ongoing (interventions implemented as appropriate)  Intervention: Foster Therapeutic [place]    12/05/17 0415   Coping Strategies   Trust Relationship/Rapport care explained;choices provided;emotional support provided;empathic listening provided;questions answered;questions encouraged;reassurance provided;thoughts/feelings acknowledged       Intervention: Mutually Develop Transition Plan    12/05/17 0415   OTHER   Transition Support crisis management plan promoted

## 2017-12-05 NOTE — PROGRESS NOTES
Continued Stay Note  [facility]     Patient Name: Imelda Lopez  MRN: [id number]  Today's Date: 12/5/2017    Admit Date: 11/29/2017          Discharge Plan       12/05/17 1132    Case Management/Social Work Plan    Additional Comments SW also informed pt that she would most likely be discharged on tomorrow.      12/05/17 1129    Case Management/Social Work Plan    Additional Comments SW spoke w/pt's  at [facility], Marisel Manuel about housing for pt.  Marisel stated that she will visit pt tomorrow, 12/5@8:30 am at request of pt.  SW informed Marisel that she attempted to discuss housing w/her and pt stated that she would like to sit down w/Marisel and SW to discuss placement.  SW informed Marisel as well as the pt that she would most likely have to go to a homeless shelter or pay to live some where until she meets w/the housing authority on 12/12.  Pt stated that she has money so that would be possible.  Marisel() suggested pt stay at [facility] for a week until she meets w/the Housing Authority.              Discharge Codes     None            KJ Mendoza

## 2017-12-05 NOTE — PROGRESS NOTES
Continued Stay Note  [facility]     Patient Name: Imelda Lopez  MRN: [id number]  Today's Date: 12/5/2017    Admit Date: 11/29/2017          Discharge Plan       12/05/17 1533    Case Management/Social Work Plan    Additional Comments SW informed pt that her , Marisel Manuel will be here to visit her in the morning, 12/6@8:30 am.              Discharge Codes     None            KJ Mendoza

## 2017-12-05 NOTE — PROGRESS NOTES
The patient remains pleasant and cooperative with staff.  She is compliant with medications and has requested reinitiation of calcium and iron which she had taken outside the hospital.  It is worth noting that the patient has been pleasant cooperative and denying suicidal homicidal ideation or any psychotic symptoms during her stay in the hospital and for this reason I have not initiated any psychotropic medications.  We continue to work towards placement.

## 2017-12-06 RX ADMIN — NAPROXEN 250 MG: 500 TABLET ORAL at 08:44

## 2017-12-06 RX ADMIN — LEVETIRACETAM 1000 MG: 500 TABLET, FILM COATED ORAL at 20:34

## 2017-12-06 RX ADMIN — AMLODIPINE BESYLATE 10 MG: 10 TABLET ORAL at 08:43

## 2017-12-06 RX ADMIN — CALCIUM CARBONATE-VITAMIN D TAB 500 MG-200 UNIT 500 MG: 500-200 TAB at 08:43

## 2017-12-06 RX ADMIN — FERROUS SULFATE TAB 325 MG (65 MG ELEMENTAL FE) 325 MG: 325 (65 FE) TAB at 08:43

## 2017-12-06 RX ADMIN — LEVETIRACETAM 1000 MG: 500 TABLET, FILM COATED ORAL at 08:43

## 2017-12-06 RX ADMIN — NAPROXEN 250 MG: 500 TABLET ORAL at 17:36
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
## 2017-12-06 NOTE — PLAN OF CARE
Problem:  Patient Care Overview (Adult)  Goal: Plan of Care Review  Outcome: Ongoing (interventions implemented as appropriate)    12/05/17 2045 12/06/17 0407   Patient Care Overview   Progress --  improving   Outcome Evaluation   Outcome Summary/Follow up Plan --  Pt cooperative and compliant with medications, denied all issues, no thoughts to hurt self or others, will continue to monitor changes in mood and behaviors, provide feedback and support.   Coping/Psychosocial Response Interventions   Plan Of Care Reviewed With patient --    Coping/Psychosocial   Patient Agreement with Plan of Care agrees --          Problem:  Overarching Goals  Goal: Adheres to Safety Considerations for Self and Others  Outcome: Ongoing (interventions implemented as appropriate)  Intervention: Develop/maintain Individualized Safety Plan    12/05/17 2045   Safety   Safety Measures safety rounds completed;suicide assessment completed   Provide Emotional/Physical Safety   Suicidal Ideation no   Willingness to Contact Staff Member if Feeling Like Hurting Self yes   Mental Health Homicide Risk   Homicidal Ideation no   Willingness to Contact Staff Member if Feeling Like Hurting Others yes         Goal: Optimized Coping Skills in Response to Life Stressors  Outcome: Ongoing (interventions implemented as appropriate)  Intervention: Promote Effective Coping Strategies    12/05/17 2045   Coping Strategies   Supportive Measures active listening utilized;verbalization of feelings encouraged         Goal: Develops/Participates in Therapeutic [place] to Support Successful Transition  Outcome: Ongoing (interventions implemented as appropriate)  Intervention: Foster Therapeutic [place]    12/05/17 2045   Coping Strategies   Trust Relationship/Rapport care explained;choices provided;emotional support provided;questions answered;empathic listening provided;questions encouraged;reassurance provided;thoughts/feelings acknowledged       Intervention:  Mutually Develop Transition Plan    12/05/17 2045   OTHER   Transition Support crisis management plan promoted

## 2017-12-07 RX ADMIN — LEVETIRACETAM 1000 MG: 500 TABLET, FILM COATED ORAL at 08:31

## 2017-12-07 RX ADMIN — FERROUS SULFATE TAB 325 MG (65 MG ELEMENTAL FE) 325 MG: 325 (65 FE) TAB at 08:30

## 2017-12-07 RX ADMIN — LEVETIRACETAM 1000 MG: 500 TABLET, FILM COATED ORAL at 21:00

## 2017-12-07 RX ADMIN — NAPROXEN 250 MG: 500 TABLET ORAL at 17:25

## 2017-12-07 RX ADMIN — NAPROXEN 250 MG: 500 TABLET ORAL at 08:30

## 2017-12-07 RX ADMIN — AMLODIPINE BESYLATE 10 MG: 10 TABLET ORAL at 08:30

## 2017-12-07 RX ADMIN — CALCIUM CARBONATE-VITAMIN D TAB 500 MG-200 UNIT 500 MG: 500-200 TAB at 08:30

## 2017-12-07 NOTE — PROGRESS NOTES
The patient remains generally cooperative with care.  We plan to discharge the patient to [facility] tomorrow and she will meet with Arelis Michael regarding more permanent disposition next Tuesday.
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
## 2017-12-07 NOTE — PLAN OF CARE
Problem:  Patient Care Overview (Adult)  Goal: Plan of Care Review  Outcome: Ongoing (interventions implemented as appropriate)    12/06/17 2004   Patient Care Overview   Progress improving   Outcome Evaluation   Outcome Summary/Follow up Plan PT is pleasant and cooperative. Compliant with medications. Attending groups. Denies any mental health issues . Will continue to montior behavior, provide feedback and support.    Coping/Psychosocial Response Interventions   Plan Of Care Reviewed With patient   Coping/Psychosocial   Patient Agreement with Plan of Care agrees       Goal: Interdisciplinary Rounds/Family Conference  Outcome: Ongoing (interventions implemented as appropriate)  Goal: Individualization and Mutuality  Outcome: Ongoing (interventions implemented as appropriate)    12/06/17 2004   Behavioral Health Screens   Patient Personal Strengths coping skills;expressive of needs;expressive of emotions;compliant with treatment/medications;motivated for recovery;motivated for treatment;positive vocational history;positive attitude       Goal: Discharge Needs Assessment  Outcome: Ongoing (interventions implemented as appropriate)    Problem:  Overarching Goals  Goal: Adheres to Safety Considerations for Self and Others  Outcome: Ongoing (interventions implemented as appropriate)  Intervention: Develop/maintain Individualized Safety Plan    12/06/17 1610   Provide Emotional/Physical Safety   Willingness to Contact Staff Member if Feeling Like Hurting Self yes   Mental Health Homicide Risk   Homicidal Ideation no   Willingness to Contact Staff Member if Feeling Like Hurting Others yes         12/06/17 1610   Provide Emotional/Physical Safety   Willingness to Contact Staff Member if Feeling Like Hurting Self yes   Mental Health Homicide Risk   Homicidal Ideation no   Willingness to Contact Staff Member if Feeling Like Hurting Others yes         Goal: Optimized Coping Skills in Response to Life Stressors  Outcome:  Ongoing (interventions implemented as appropriate)  Intervention: Promote Effective Coping Strategies    12/06/17 8760   Coping Strategies   Supportive Measures active listening utilized;positive reinforcement provided;self-responsibility promoted;verbalization of feelings encouraged         Goal: Develops/Participates in Therapeutic [place] to Support Successful Transition  Outcome: Ongoing (interventions implemented as appropriate)
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
## 2017-12-07 NOTE — PLAN OF CARE
Problem:  Patient Care Overview (Adult)  Goal: Plan of Care Review  Outcome: Ongoing (interventions implemented as appropriate)    12/07/17 1618   Patient Care Overview   Progress improving   Outcome Evaluation   Outcome Summary/Follow up Plan The patient denied any anxiety, depression, SI, HI, pain, or hallucinations. The patient voiced she was hopeful for discharge and ready to leave. The patient has been pleasent and spent most of the day in the lounge, but voiced she did not want to go to group classes because they will not help with her situation. Cooperative with medications. Continuing to monitor.    Coping/Psychosocial Response Interventions   Plan Of Care Reviewed With patient   Coping/Psychosocial   Patient Agreement with Plan of Care agrees       Goal: Interdisciplinary Rounds/Family Conference  Outcome: Ongoing (interventions implemented as appropriate)  Goal: Individualization and Mutuality  Outcome: Ongoing (interventions implemented as appropriate)  Goal: Discharge Needs Assessment  Outcome: Ongoing (interventions implemented as appropriate)    Problem:  Overarching Goals  Goal: Adheres to Safety Considerations for Self and Others  Outcome: Ongoing (interventions implemented as appropriate)  Intervention: Develop/maintain Individualized Safety Plan    12/07/17 1005   Safety   Safety Measures safety rounds completed;suicide assessment completed   Provide Emotional/Physical Safety   Suicidal Ideation no   Willingness to Contact Staff Member if Feeling Like Hurting Self yes   Mental Health Homicide Risk   Homicidal Ideation no   Willingness to Contact Staff Member if Feeling Like Hurting Others yes         Goal: Optimized Coping Skills in Response to Life Stressors  Outcome: Ongoing (interventions implemented as appropriate)  Intervention: Promote Effective Coping Strategies    12/07/17 1005   Coping Strategies   Supportive Measures active listening utilized;relaxation techniques promoted;self-care  encouraged;self-reflection promoted;self-responsibility promoted;verbalization of feelings encouraged         Goal: Develops/Participates in Therapeutic [place] to Support Successful Transition  Outcome: Ongoing (interventions implemented as appropriate)  Intervention: Foster Therapeutic [place]    12/07/17 1005   Coping Strategies   Trust Relationship/Rapport care explained;choices provided;emotional support provided;empathic listening provided;questions answered;questions encouraged;reassurance provided;thoughts/feelings acknowledged           Problem: Suicide Risk (Adult,Obstetrics,Pediatric)  Goal: Identify Related Risk Factors and Signs and Symptoms  Outcome: Outcome(s) achieved Date Met:  12/07/17 12/07/17 1618   Suicide Risk   Suicide Risk: Related Risk Factors mental health diagnosis   Signs and Symptoms (Suicide Risk) suicidal ideation/intent/plan       Goal: Strength-Based Wellness/Recovery  Outcome: Ongoing (interventions implemented as appropriate)    12/07/17 1618   Suicide Risk (Adult,Obstetrics,Pediatric)   Strength-Based Wellness/Recovery making progress toward outcome       Goal: Physical Safety  Outcome: Ongoing (interventions implemented as appropriate)    12/07/17 1618   Suicide Risk (Adult,Obstetrics,Pediatric)   Physical Safety making progress toward outcome

## 2017-12-07 NOTE — PLAN OF CARE
Problem: BH Patient Care Overview (Adult)  Goal: Plan of Care Review  Outcome: Ongoing (interventions implemented as appropriate)    12/06/17 2033 12/07/17 0429   Patient Care Overview   Progress --  improving   Outcome Evaluation   Outcome Summary/Follow up Plan --  Pt cooperative and compliant with medications and assessment, denies all issues, no thoughts to hurt self or others, out in milieu watching tv, will continue to monitor changes in mood and behaviors, provide feedback and support.   Coping/Psychosocial Response Interventions   Plan Of Care Reviewed With patient --    Coping/Psychosocial   Patient Agreement with Plan of Care agrees --          Problem:  Overarching Goals  Goal: Adheres to Safety Considerations for Self and Others  Outcome: Ongoing (interventions implemented as appropriate)  Intervention: Develop/maintain Individualized Safety Plan    12/06/17 2033   Safety   Safety Measures safety rounds completed;suicide assessment completed   Provide Emotional/Physical Safety   Suicidal Ideation no   Willingness to Contact Staff Member if Feeling Like Hurting Self yes   Mental Health Homicide Risk   Homicidal Ideation no   Willingness to Contact Staff Member if Feeling Like Hurting Others yes         Goal: Optimized Coping Skills in Response to Life Stressors  Outcome: Ongoing (interventions implemented as appropriate)  Intervention: Promote Effective Coping Strategies    12/06/17 2033   Coping Strategies   Supportive Measures active listening utilized;verbalization of feelings encouraged         Goal: Develops/Participates in Therapeutic [place] to Support Successful Transition  Outcome: Ongoing (interventions implemented as appropriate)  Intervention: Foster Therapeutic [place]    12/06/17 2033   Coping Strategies   Trust Relationship/Rapport care explained;choices provided;emotional support provided;empathic listening provided;questions answered;questions encouraged;reassurance  provided;thoughts/feelings acknowledged       Intervention: Mutually Develop Transition Plan    12/06/17 2033   OTHER   Transition Support crisis management plan promoted

## 2017-12-08 VITALS
RESPIRATION RATE: 18 BRPM | HEART RATE: 86 BPM | WEIGHT: 133 LBS | DIASTOLIC BLOOD PRESSURE: 70 MMHG | OXYGEN SATURATION: 96 % | TEMPERATURE: 97.9 F | BODY MASS INDEX: 19.08 KG/M2 | SYSTOLIC BLOOD PRESSURE: 115 MMHG

## 2017-12-08 RX ORDER — AMLODIPINE BESYLATE 10 MG/1
10 TABLET ORAL
Qty: 30 TABLET | Refills: 1 | Status: SHIPPED | OUTPATIENT
Start: 2017-12-09

## 2017-12-08 RX ORDER — FERROUS SULFATE 325(65) MG
325 TABLET ORAL
Qty: 30 TABLET | Refills: 0 | Status: SHIPPED | OUTPATIENT
Start: 2017-12-09

## 2017-12-08 RX ORDER — LEVETIRACETAM 1000 MG/1
1000 TABLET ORAL EVERY 12 HOURS SCHEDULED
Qty: 60 TABLET | Refills: 0 | Status: SHIPPED | OUTPATIENT
Start: 2017-12-08

## 2017-12-08 RX ADMIN — FERROUS SULFATE TAB 325 MG (65 MG ELEMENTAL FE) 325 MG: 325 (65 FE) TAB at 08:43

## 2017-12-08 RX ADMIN — AMLODIPINE BESYLATE 10 MG: 10 TABLET ORAL at 08:43

## 2017-12-08 RX ADMIN — NAPROXEN 250 MG: 500 TABLET ORAL at 08:43

## 2017-12-08 RX ADMIN — CALCIUM CARBONATE-VITAMIN D TAB 500 MG-200 UNIT 500 MG: 500-200 TAB at 08:43

## 2017-12-08 RX ADMIN — LEVETIRACETAM 1000 MG: 500 TABLET, FILM COATED ORAL at 08:43

## 2017-12-08 NOTE — PROGRESS NOTES
Continued Stay Note  [facility]     Patient Name: Imelda Lopez  MRN: [id number]  Today's Date: 12/8/2017    Admit Date: 11/29/2017          Discharge Plan       12/08/17 1340    Final Note    Final Note Pt was discharged per Dr. Boss's orders.  SW met w/pt to review discharge plan to go to [facility] until she has her appt w/the housing authority on Tuesday, 12/12; pt agreed at the time.  FLORA also discussed pt going to the [facility] if she decided to leave [facility].  SW informed pt of appts at [facility] where she is currently a client on Tuesday, 12/12@1 with Therapist and Thursday, 12/14@9 am.  SW reiterated to pt how important it was to go to the appt at the [facility] on Tuesday, 12/12 @9  am in order to get emergency housing.  FLORA also gave pt tips for the homeless packet.  FLORA attempted to contact pt's next of kin in [place] but did not receive a call back.  When it was time for pt to leave, she refused to leave and security had to be called; pt was compliant and left in the cab.              Discharge Codes       12/08/17 1349    Discharge Codes    Discharge Codes 01  Discharge to home        Expected Discharge Date and Time     Expected Discharge Date Expected Discharge Time    Dec 8, 2017             KJ Mendoza

## 2017-12-08 NOTE — PLAN OF CARE
Problem:  Patient Care Overview (Adult)  Goal: Plan of Care Review  Outcome: Outcome(s) achieved Date Met:  12/08/17 12/08/17 1518   Patient Care Overview   Progress improving   Outcome Evaluation   Outcome Summary/Follow up Plan Reviewed discharge paperwork with patient who verbalized understanding. Status is stable. Returned patient's personal items to her. PT refused to sign discharge paper work, until after she spoke with her family in [place]. Uncooperative. Call placed to house supervisor, who advised this RN to request assist from security. PT was transported to awaiting taxi per  with 2 security officers and this RN. PT was compliant with transportion to taxi.   Coping/Psychosocial Response Interventions   Plan Of Care Reviewed With patient   Coping/Psychosocial   Patient Agreement with Plan of Care agrees       Goal: Interdisciplinary Rounds/Family Conference  Outcome: Ongoing (interventions implemented as appropriate)  Goal: Individualization and Mutuality  Outcome: Ongoing (interventions implemented as appropriate)    12/08/17 1518   Behavioral Health Screens   Patient Personal Strengths expressive of emotions;expressive of needs;compliant with treatment/medications       Goal: Discharge Needs Assessment  Outcome: Ongoing (interventions implemented as appropriate)    Problem:  Overarching Goals  Goal: Adheres to Safety Considerations for Self and Others  Outcome: Ongoing (interventions implemented as appropriate)  Goal: Optimized Coping Skills in Response to Life Stressors  Outcome: Ongoing (interventions implemented as appropriate)  Intervention: Promote Effective Coping Strategies    12/08/17 0901   Coping Strategies   Supportive Measures active listening utilized;relaxation techniques promoted;self-care encouraged;self-reflection promoted;self-responsibility promoted;verbalization of feelings encouraged         Goal: Develops/Participates in Therapeutic [place] to Support Successful  Transition  Outcome: Ongoing (interventions implemented as appropriate)

## 2017-12-08 NOTE — PLAN OF CARE
Problem:  Patient Care Overview (Adult)  Goal: Plan of Care Review  Outcome: Ongoing (interventions implemented as appropriate)    12/08/17 0257   Outcome Evaluation   Outcome Summary/Follow up Plan Pt pleasant and cooperative this shift. Alert and oriented X 3. Pt denied depression and anxiety. Pt denied SI, HI, and hallucinations. Pt compliant with meds. Pt out in dayroom this shift watching tv. Pt remains guarded. Pt continues to wear the facemask, even while sleeping. Pt appeared to rest well throughout the night with no issues noted. Will continue to monitor.    Coping/Psychosocial Response Interventions   Plan Of Care Reviewed With patient   Coping/Psychosocial   Patient Agreement with Plan of Care agrees         Problem:  Overarching Goals  Goal: Adheres to Safety Considerations for Self and Others  Outcome: Ongoing (interventions implemented as appropriate)    12/08/17 0257   Overarching Goals   Adheres to Safety Considerations Promote/provide immediate and ongoing protective physical enviornment. Provide environmental rounds/environment of care safety checks at the beginning of shift and situational/prn as needed.        Goal: Optimized Coping Skills in Response to Life Stressors  Outcome: Ongoing (interventions implemented as appropriate)    12/08/17 0257   Overarching Goals   Optimized Coping Skills Identify current effective/ineffective coping strategies. Assist wtih developing/using positive coping strategies.        Goal: Develops/Participates in Therapeutic [place] to Support Successful Transition  Outcome: Ongoing (interventions implemented as appropriate)    12/08/17 0257   Patient Care Overview   Develop/Participate Therapeutic [place] Establish rapport; develop trust relationship. Provide emotional support.

## 2017-12-11 ENCOUNTER — TELEPHONE (OUTPATIENT)
Dept: PSYCHIATRY | Facility: HOSPITAL | Age: 62
End: 2017-12-11

## 2018-01-15 ENCOUNTER — DOCUMENTATION (OUTPATIENT)
Dept: NEUROLOGY | Facility: HOSPITAL | Age: 63
End: 2018-01-15

## 2020-08-23 NOTE — PLAN OF CARE
Problem: BH Patient Care Overview (Adult)  Goal: Plan of Care Review  Outcome: Ongoing (interventions implemented as appropriate)    12/05/17 1819   Patient Care Overview   Progress improving   Outcome Evaluation   Outcome Summary/Follow up Plan PT denies any: anxiety, depression, SI/HI, hallucinations nor pain. PT is appropriate and compliant with meds and treatments. Attending groups. Will continue to monitor behavior , provide feedback and support.   Coping/Psychosocial Response Interventions   Plan Of Care Reviewed With patient   Coping/Psychosocial   Patient Agreement with Plan of Care agrees       Goal: Interdisciplinary Rounds/Family Conference  Outcome: Ongoing (interventions implemented as appropriate)  Goal: Individualization and Mutuality  Outcome: Ongoing (interventions implemented as appropriate)    12/05/17 1819   Behavioral Health Screens   Patient Personal Strengths expressive of emotions;expressive of needs;motivated for recovery;motivated for treatment;compliant with treatment/medications       Goal: Discharge Needs Assessment  Outcome: Ongoing (interventions implemented as appropriate)    Problem:  Overarching Goals  Goal: Adheres to Safety Considerations for Self and Others  Outcome: Ongoing (interventions implemented as appropriate)  Intervention: Develop/maintain Individualized Safety Plan    12/05/17 1610   Safety   Safety Measures safety rounds completed   Provide Emotional/Physical Safety   Suicidal Ideation no   Willingness to Contact Staff Member if Feeling Like Hurting Self yes   Mental Health Homicide Risk   Homicidal Ideation no   Willingness to Contact Staff Member if Feeling Like Hurting Others yes         Goal: Optimized Coping Skills in Response to Life Stressors  Outcome: Ongoing (interventions implemented as appropriate)  Goal: Develops/Participates in Therapeutic [place] to Support Successful Transition  Outcome: Ongoing (interventions implemented as appropriate)       EMT/paramedic

## 2022-09-09 NOTE — DISCHARGE SUMMARY
"DATES OF ADMISSION: 11/29/2017-12/8/2017    REASON FOR ADMISSION: The patient is a 62-year-old Afro-American female who is chronically homeless.  She is admitted from the main hospital she was treated for hyponatremia and pneumonia after having been discharged from the [facility] emergency room at 2 AM in the morning and having been found freezing to death in a local bus shelter by EMS.    LABS:  See chart            HOSPITAL COURSE:  The patient was admitted to the [facility] and placed on [place] 2 programming.  She was, throughout her stay in the hospital, generally pleasant and cooperative requesting only initiation of iron and calcium.  On a couple of occasions she did become somewhat cantankerous with social work regarding her placement status, threatening to jeramy, but otherwise was really no management issue.  At no point did she exhibit symptoms of depression or psychosis which this MD felt were sufficient to suggest initiation of antidepressant or antipsychotic medication.  Representatives from [facility] did participate in the patient's disposition planning, and on 12/8/2017, the patient was discharged to \"[facility]" with [facility] remaining active in her disposition process.    FINAL DIAGNOSIS:  Chronic paranoid schizophrenia by history, seizure disorder, iron deficiency anemia    DISPOSITION ON DISCHARGE:  The patient is discharged to community mental health follow-up and will be sent to \"[facility]\".  A full listing of her medications is provided below.    PROGNOSIS: Guarded     John Imelda   Home Medication Instructions JOSÉ:[id number]    Printed on:12/08/17 1021   Medication Information                      amLODIPine (NORVASC) 10 MG tablet  Take 1 tablet by mouth Daily.             Calcium Carbonate-Vitamin D (CALCIUM-VITAMIN D) 500-200 MG-UNIT tablet per tablet  Take 1 tablet by mouth Daily.             ferrous sulfate 325 (65 FE) MG tablet  Take 1 tablet by mouth Daily With " Pt denies any needs, no distress noted;   Breakfast.             levETIRAcetam (KEPPRA) 1000 MG tablet  Take 1 tablet by mouth Every 12 (Twelve) Hours.             naproxen (NAPROSYN) 250 MG tablet  Take 1 tablet by mouth 2 (Two) Times a Day As Needed for Mild Pain .